# Patient Record
Sex: MALE | Race: WHITE | NOT HISPANIC OR LATINO
[De-identification: names, ages, dates, MRNs, and addresses within clinical notes are randomized per-mention and may not be internally consistent; named-entity substitution may affect disease eponyms.]

---

## 2018-03-10 ENCOUNTER — APPOINTMENT (OUTPATIENT)
Dept: ORTHOPEDIC SURGERY | Facility: CLINIC | Age: 68
End: 2018-03-10
Payer: MEDICARE

## 2018-03-10 VITALS
WEIGHT: 210 LBS | HEART RATE: 79 BPM | HEIGHT: 73 IN | BODY MASS INDEX: 27.83 KG/M2 | SYSTOLIC BLOOD PRESSURE: 115 MMHG | DIASTOLIC BLOOD PRESSURE: 74 MMHG

## 2018-03-10 DIAGNOSIS — Z86.39 PERSONAL HISTORY OF OTHER ENDOCRINE, NUTRITIONAL AND METABOLIC DISEASE: ICD-10-CM

## 2018-03-10 PROCEDURE — 73562 X-RAY EXAM OF KNEE 3: CPT | Mod: 50

## 2018-03-10 PROCEDURE — 99204 OFFICE O/P NEW MOD 45 MIN: CPT

## 2018-03-10 PROCEDURE — 73502 X-RAY EXAM HIP UNI 2-3 VIEWS: CPT

## 2018-03-10 RX ORDER — MULTIVITAMIN
TABLET ORAL
Refills: 0 | Status: ACTIVE | COMMUNITY

## 2018-03-10 RX ORDER — OMEPRAZOLE 20 MG/1
20 CAPSULE, DELAYED RELEASE ORAL
Qty: 90 | Refills: 0 | Status: ACTIVE | COMMUNITY
Start: 2017-11-13

## 2018-03-10 RX ORDER — ROSUVASTATIN CALCIUM 5 MG/1
TABLET, FILM COATED ORAL
Refills: 0 | Status: ACTIVE | COMMUNITY

## 2018-03-10 RX ORDER — FAMOTIDINE 10 MG/1
TABLET, FILM COATED ORAL
Refills: 0 | Status: ACTIVE | COMMUNITY

## 2018-03-10 RX ORDER — NEOMYCIN AND POLYMYXIN B SULFATES AND HYDROCORTISONE OTIC 10; 3.5; 1 MG/ML; MG/ML; [USP'U]/ML
3.5-10000-1 SUSPENSION AURICULAR (OTIC)
Qty: 10 | Refills: 0 | Status: ACTIVE | COMMUNITY
Start: 2018-02-19

## 2018-03-13 ENCOUNTER — FORM ENCOUNTER (OUTPATIENT)
Age: 68
End: 2018-03-13

## 2018-03-14 ENCOUNTER — OUTPATIENT (OUTPATIENT)
Dept: OUTPATIENT SERVICES | Facility: HOSPITAL | Age: 68
LOS: 1 days | End: 2018-03-14
Payer: MEDICARE

## 2018-03-14 ENCOUNTER — APPOINTMENT (OUTPATIENT)
Dept: RADIOLOGY | Facility: CLINIC | Age: 68
End: 2018-03-14
Payer: MEDICARE

## 2018-03-14 DIAGNOSIS — M16.12 UNILATERAL PRIMARY OSTEOARTHRITIS, LEFT HIP: ICD-10-CM

## 2018-03-14 PROCEDURE — 27093 INJECTION FOR HIP X-RAY: CPT

## 2018-03-14 PROCEDURE — 27093 INJECTION FOR HIP X-RAY: CPT | Mod: LT

## 2018-03-14 PROCEDURE — 73525 CONTRAST X-RAY OF HIP: CPT | Mod: 26,LT

## 2018-03-14 PROCEDURE — 73525 CONTRAST X-RAY OF HIP: CPT

## 2018-03-20 ENCOUNTER — APPOINTMENT (OUTPATIENT)
Dept: ORTHOPEDIC SURGERY | Facility: CLINIC | Age: 68
End: 2018-03-20
Payer: MEDICARE

## 2018-03-20 VITALS
HEART RATE: 72 BPM | DIASTOLIC BLOOD PRESSURE: 79 MMHG | SYSTOLIC BLOOD PRESSURE: 116 MMHG | BODY MASS INDEX: 27.7 KG/M2 | HEIGHT: 73 IN | WEIGHT: 209 LBS

## 2018-03-20 DIAGNOSIS — M17.0 BILATERAL PRIMARY OSTEOARTHRITIS OF KNEE: ICD-10-CM

## 2018-03-20 PROCEDURE — 99213 OFFICE O/P EST LOW 20 MIN: CPT | Mod: 25

## 2018-03-20 PROCEDURE — 20610 DRAIN/INJ JOINT/BURSA W/O US: CPT | Mod: 50

## 2018-03-20 RX ADMIN — Medication 0 MG/3ML: at 00:00

## 2018-03-22 RX ORDER — HYALURONATE SOD, CROSS-LINKED 30 MG/3 ML
30 SYRINGE (ML) INTRAARTICULAR
Refills: 0 | Status: COMPLETED | OUTPATIENT
Start: 2018-03-20

## 2018-03-22 RX ADMIN — LIDOCAINE HYDROCHLORIDE 3 %: 10 INJECTION, SOLUTION EPIDURAL; INFILTRATION; INTRACAUDAL; PERINEURAL at 00:00

## 2018-03-23 RX ORDER — LIDOCAINE HYDROCHLORIDE 10 MG/ML
1 INJECTION, SOLUTION INFILTRATION; PERINEURAL
Qty: 0 | Refills: 0 | Status: COMPLETED | OUTPATIENT
Start: 2018-03-22

## 2018-07-16 ENCOUNTER — APPOINTMENT (OUTPATIENT)
Dept: ORTHOPEDIC SURGERY | Facility: CLINIC | Age: 68
End: 2018-07-16

## 2018-10-09 ENCOUNTER — APPOINTMENT (OUTPATIENT)
Dept: ORTHOPEDIC SURGERY | Facility: CLINIC | Age: 68
End: 2018-10-09
Payer: MEDICARE

## 2018-10-09 VITALS
HEART RATE: 66 BPM | SYSTOLIC BLOOD PRESSURE: 104 MMHG | DIASTOLIC BLOOD PRESSURE: 67 MMHG | WEIGHT: 209 LBS | BODY MASS INDEX: 27.7 KG/M2 | HEIGHT: 73 IN

## 2018-10-09 VITALS — HEIGHT: 73 IN | BODY MASS INDEX: 27.7 KG/M2 | WEIGHT: 209 LBS

## 2018-10-09 DIAGNOSIS — M16.12 UNILATERAL PRIMARY OSTEOARTHRITIS, LEFT HIP: ICD-10-CM

## 2018-10-09 PROCEDURE — 99213 OFFICE O/P EST LOW 20 MIN: CPT

## 2018-10-09 PROCEDURE — 73502 X-RAY EXAM HIP UNI 2-3 VIEWS: CPT

## 2018-10-24 ENCOUNTER — OUTPATIENT (OUTPATIENT)
Dept: OUTPATIENT SERVICES | Facility: HOSPITAL | Age: 68
LOS: 1 days | End: 2018-10-24
Payer: MEDICARE

## 2018-10-24 VITALS
OXYGEN SATURATION: 96 % | SYSTOLIC BLOOD PRESSURE: 101 MMHG | DIASTOLIC BLOOD PRESSURE: 70 MMHG | RESPIRATION RATE: 16 BRPM | HEIGHT: 73 IN | HEART RATE: 66 BPM | TEMPERATURE: 99 F | WEIGHT: 213.85 LBS

## 2018-10-24 DIAGNOSIS — Z90.79 ACQUIRED ABSENCE OF OTHER GENITAL ORGAN(S): Chronic | ICD-10-CM

## 2018-10-24 DIAGNOSIS — Z01.818 ENCOUNTER FOR OTHER PREPROCEDURAL EXAMINATION: ICD-10-CM

## 2018-10-24 DIAGNOSIS — M25.552 PAIN IN LEFT HIP: ICD-10-CM

## 2018-10-24 DIAGNOSIS — C20 MALIGNANT NEOPLASM OF RECTUM: Chronic | ICD-10-CM

## 2018-10-24 DIAGNOSIS — M16.12 UNILATERAL PRIMARY OSTEOARTHRITIS, LEFT HIP: ICD-10-CM

## 2018-10-24 DIAGNOSIS — Z98.890 OTHER SPECIFIED POSTPROCEDURAL STATES: Chronic | ICD-10-CM

## 2018-10-24 LAB
ALBUMIN SERPL ELPH-MCNC: 4 G/DL — SIGNIFICANT CHANGE UP (ref 3.3–5)
ALP SERPL-CCNC: 85 U/L — SIGNIFICANT CHANGE UP (ref 30–120)
ALT FLD-CCNC: 31 U/L DA — SIGNIFICANT CHANGE UP (ref 10–60)
ANION GAP SERPL CALC-SCNC: 6 MMOL/L — SIGNIFICANT CHANGE UP (ref 5–17)
APTT BLD: 38.3 SEC — HIGH (ref 27.5–37.4)
AST SERPL-CCNC: 16 U/L — SIGNIFICANT CHANGE UP (ref 10–40)
BILIRUB SERPL-MCNC: 0.9 MG/DL — SIGNIFICANT CHANGE UP (ref 0.2–1.2)
BUN SERPL-MCNC: 20 MG/DL — SIGNIFICANT CHANGE UP (ref 7–23)
CALCIUM SERPL-MCNC: 9.8 MG/DL — SIGNIFICANT CHANGE UP (ref 8.4–10.5)
CHLORIDE SERPL-SCNC: 103 MMOL/L — SIGNIFICANT CHANGE UP (ref 96–108)
CO2 SERPL-SCNC: 29 MMOL/L — SIGNIFICANT CHANGE UP (ref 22–31)
CREAT SERPL-MCNC: 1.27 MG/DL — SIGNIFICANT CHANGE UP (ref 0.5–1.3)
GLUCOSE SERPL-MCNC: 89 MG/DL — SIGNIFICANT CHANGE UP (ref 70–99)
HCT VFR BLD CALC: 44.1 % — SIGNIFICANT CHANGE UP (ref 39–50)
HGB BLD-MCNC: 14.8 G/DL — SIGNIFICANT CHANGE UP (ref 13–17)
INR BLD: 1.09 RATIO — SIGNIFICANT CHANGE UP (ref 0.88–1.16)
MCHC RBC-ENTMCNC: 30.8 PG — SIGNIFICANT CHANGE UP (ref 27–34)
MCHC RBC-ENTMCNC: 33.6 GM/DL — SIGNIFICANT CHANGE UP (ref 32–36)
MCV RBC AUTO: 91.9 FL — SIGNIFICANT CHANGE UP (ref 80–100)
NRBC # BLD: 0 /100 WBCS — SIGNIFICANT CHANGE UP (ref 0–0)
PLATELET # BLD AUTO: 174 K/UL — SIGNIFICANT CHANGE UP (ref 150–400)
POTASSIUM SERPL-MCNC: 4.5 MMOL/L — SIGNIFICANT CHANGE UP (ref 3.5–5.3)
POTASSIUM SERPL-SCNC: 4.5 MMOL/L — SIGNIFICANT CHANGE UP (ref 3.5–5.3)
PROT SERPL-MCNC: 7.9 G/DL — SIGNIFICANT CHANGE UP (ref 6–8.3)
PROTHROM AB SERPL-ACNC: 11.9 SEC — SIGNIFICANT CHANGE UP (ref 9.8–12.7)
RBC # BLD: 4.8 M/UL — SIGNIFICANT CHANGE UP (ref 4.2–5.8)
RBC # FLD: 11.9 % — SIGNIFICANT CHANGE UP (ref 10.3–14.5)
SODIUM SERPL-SCNC: 138 MMOL/L — SIGNIFICANT CHANGE UP (ref 135–145)
WBC # BLD: 8.42 K/UL — SIGNIFICANT CHANGE UP (ref 3.8–10.5)
WBC # FLD AUTO: 8.42 K/UL — SIGNIFICANT CHANGE UP (ref 3.8–10.5)

## 2018-10-24 PROCEDURE — 86900 BLOOD TYPING SEROLOGIC ABO: CPT

## 2018-10-24 PROCEDURE — 86901 BLOOD TYPING SEROLOGIC RH(D): CPT

## 2018-10-24 PROCEDURE — 85610 PROTHROMBIN TIME: CPT

## 2018-10-24 PROCEDURE — 85027 COMPLETE CBC AUTOMATED: CPT

## 2018-10-24 PROCEDURE — 87641 MR-STAPH DNA AMP PROBE: CPT

## 2018-10-24 PROCEDURE — 80053 COMPREHEN METABOLIC PANEL: CPT

## 2018-10-24 PROCEDURE — 86850 RBC ANTIBODY SCREEN: CPT

## 2018-10-24 PROCEDURE — 85730 THROMBOPLASTIN TIME PARTIAL: CPT

## 2018-10-24 PROCEDURE — G0463: CPT

## 2018-10-24 PROCEDURE — 87640 STAPH A DNA AMP PROBE: CPT

## 2018-10-24 NOTE — H&P PST ADULT - PSH
Rectal cancer  2004 tumor removed  S/P insertion of penile implant  2013  S/P knee surgery  bilateral knees  S/P prostatectomy  2008

## 2018-10-24 NOTE — H&P PST ADULT - HISTORY OF PRESENT ILLNESS
this is a 69 y/o male who has had left hip pain for about a year; tests show bone on bone; to have left total hip replacement; takes advil for pain prn

## 2018-10-24 NOTE — H&P PST ADULT - PMH
Aortic aneurysm  4.4 cm-being observed  GERD (gastroesophageal reflux disease)    Osteoarthritis    Prostate cancer    Rectal cancer

## 2018-10-24 NOTE — H&P PST ADULT - NSANTHOSAYNRD_GEN_A_CORE
No. DEANN screening performed.  STOP BANG Legend: 0-2 = LOW Risk; 3-4 = INTERMEDIATE Risk; 5-8 = HIGH Risk

## 2018-10-24 NOTE — H&P PST ADULT - FAMILY HISTORY
Father  Still living? No  Family history of prostate cancer in father, Age at diagnosis: Age Unknown  Family history of diabetes mellitus, Age at diagnosis: Age Unknown     Mother  Still living? No  Family history of breast cancer, Age at diagnosis: Age Unknown     Sibling  Still living? Yes, Estimated age: 61-70  Family history of hypertension in brother, Age at diagnosis: Age Unknown

## 2018-10-25 LAB
MRSA PCR RESULT.: SIGNIFICANT CHANGE UP
S AUREUS DNA NOSE QL NAA+PROBE: DETECTED

## 2018-10-29 RX ORDER — MUPIROCIN 20 MG/G
1 OINTMENT TOPICAL
Qty: 1 | Refills: 0 | OUTPATIENT
Start: 2018-10-29 | End: 2018-11-02

## 2018-10-29 NOTE — PROGRESS NOTE ADULT - SUBJECTIVE AND OBJECTIVE BOX
Nasal culture positive for MSSA.  Rx e-scribed to pharmacy.  Spoke with patient, use of mupirocin reviewed and understood by patient.

## 2018-11-06 RX ORDER — SODIUM CHLORIDE 9 MG/ML
1000 INJECTION, SOLUTION INTRAVENOUS
Qty: 0 | Refills: 0 | Status: DISCONTINUED | OUTPATIENT
Start: 2018-11-07 | End: 2018-11-08

## 2018-11-06 RX ORDER — PANTOPRAZOLE SODIUM 20 MG/1
40 TABLET, DELAYED RELEASE ORAL DAILY
Qty: 0 | Refills: 0 | Status: DISCONTINUED | OUTPATIENT
Start: 2018-11-08 | End: 2018-11-09

## 2018-11-06 RX ORDER — DOCUSATE SODIUM 100 MG
100 CAPSULE ORAL THREE TIMES A DAY
Qty: 0 | Refills: 0 | Status: DISCONTINUED | OUTPATIENT
Start: 2018-11-07 | End: 2018-11-09

## 2018-11-06 RX ORDER — POLYETHYLENE GLYCOL 3350 17 G/17G
17 POWDER, FOR SOLUTION ORAL DAILY
Qty: 0 | Refills: 0 | Status: DISCONTINUED | OUTPATIENT
Start: 2018-11-07 | End: 2018-11-09

## 2018-11-06 RX ORDER — MAGNESIUM HYDROXIDE 400 MG/1
30 TABLET, CHEWABLE ORAL DAILY
Qty: 0 | Refills: 0 | Status: DISCONTINUED | OUTPATIENT
Start: 2018-11-07 | End: 2018-11-09

## 2018-11-06 RX ORDER — ONDANSETRON 8 MG/1
4 TABLET, FILM COATED ORAL EVERY 6 HOURS
Qty: 0 | Refills: 0 | Status: DISCONTINUED | OUTPATIENT
Start: 2018-11-07 | End: 2018-11-09

## 2018-11-06 RX ORDER — SENNA PLUS 8.6 MG/1
2 TABLET ORAL AT BEDTIME
Qty: 0 | Refills: 0 | Status: DISCONTINUED | OUTPATIENT
Start: 2018-11-07 | End: 2018-11-09

## 2018-11-07 ENCOUNTER — APPOINTMENT (OUTPATIENT)
Dept: ORTHOPEDIC SURGERY | Facility: HOSPITAL | Age: 68
End: 2018-11-07

## 2018-11-07 ENCOUNTER — RESULT REVIEW (OUTPATIENT)
Age: 68
End: 2018-11-07

## 2018-11-07 ENCOUNTER — INPATIENT (INPATIENT)
Facility: HOSPITAL | Age: 68
LOS: 1 days | Discharge: HOME | DRG: 470 | End: 2018-11-09
Attending: ORTHOPAEDIC SURGERY | Admitting: ORTHOPAEDIC SURGERY
Payer: MEDICARE

## 2018-11-07 VITALS
RESPIRATION RATE: 14 BRPM | HEART RATE: 64 BPM | HEIGHT: 73 IN | TEMPERATURE: 97 F | WEIGHT: 211.64 LBS | OXYGEN SATURATION: 96 % | SYSTOLIC BLOOD PRESSURE: 127 MMHG | DIASTOLIC BLOOD PRESSURE: 74 MMHG

## 2018-11-07 DIAGNOSIS — C20 MALIGNANT NEOPLASM OF RECTUM: Chronic | ICD-10-CM

## 2018-11-07 DIAGNOSIS — I71.9 AORTIC ANEURYSM OF UNSPECIFIED SITE, WITHOUT RUPTURE: ICD-10-CM

## 2018-11-07 DIAGNOSIS — Z01.818 ENCOUNTER FOR OTHER PREPROCEDURAL EXAMINATION: ICD-10-CM

## 2018-11-07 DIAGNOSIS — M16.12 UNILATERAL PRIMARY OSTEOARTHRITIS, LEFT HIP: ICD-10-CM

## 2018-11-07 DIAGNOSIS — M16.9 OSTEOARTHRITIS OF HIP, UNSPECIFIED: ICD-10-CM

## 2018-11-07 DIAGNOSIS — Z98.890 OTHER SPECIFIED POSTPROCEDURAL STATES: Chronic | ICD-10-CM

## 2018-11-07 DIAGNOSIS — K21.9 GASTRO-ESOPHAGEAL REFLUX DISEASE WITHOUT ESOPHAGITIS: ICD-10-CM

## 2018-11-07 DIAGNOSIS — Z29.9 ENCOUNTER FOR PROPHYLACTIC MEASURES, UNSPECIFIED: ICD-10-CM

## 2018-11-07 DIAGNOSIS — E78.5 HYPERLIPIDEMIA, UNSPECIFIED: ICD-10-CM

## 2018-11-07 DIAGNOSIS — Z90.79 ACQUIRED ABSENCE OF OTHER GENITAL ORGAN(S): Chronic | ICD-10-CM

## 2018-11-07 PROBLEM — M19.90 UNSPECIFIED OSTEOARTHRITIS, UNSPECIFIED SITE: Chronic | Status: ACTIVE | Noted: 2018-10-24

## 2018-11-07 LAB
ANION GAP SERPL CALC-SCNC: 9 MMOL/L — SIGNIFICANT CHANGE UP (ref 5–17)
APTT BLD: 36.5 SEC — SIGNIFICANT CHANGE UP (ref 28.5–37)
BUN SERPL-MCNC: 19 MG/DL — SIGNIFICANT CHANGE UP (ref 7–23)
CALCIUM SERPL-MCNC: 8.7 MG/DL — SIGNIFICANT CHANGE UP (ref 8.4–10.5)
CHLORIDE SERPL-SCNC: 105 MMOL/L — SIGNIFICANT CHANGE UP (ref 96–108)
CO2 SERPL-SCNC: 26 MMOL/L — SIGNIFICANT CHANGE UP (ref 22–31)
CREAT SERPL-MCNC: 1.17 MG/DL — SIGNIFICANT CHANGE UP (ref 0.5–1.3)
GLUCOSE SERPL-MCNC: 139 MG/DL — HIGH (ref 70–99)
HCT VFR BLD CALC: 37.9 % — LOW (ref 39–50)
HGB BLD-MCNC: 13.1 G/DL — SIGNIFICANT CHANGE UP (ref 13–17)
POTASSIUM SERPL-MCNC: 3.9 MMOL/L — SIGNIFICANT CHANGE UP (ref 3.5–5.3)
POTASSIUM SERPL-SCNC: 3.9 MMOL/L — SIGNIFICANT CHANGE UP (ref 3.5–5.3)
SODIUM SERPL-SCNC: 140 MMOL/L — SIGNIFICANT CHANGE UP (ref 135–145)

## 2018-11-07 PROCEDURE — 88305 TISSUE EXAM BY PATHOLOGIST: CPT | Mod: 26

## 2018-11-07 PROCEDURE — 27130 TOTAL HIP ARTHROPLASTY: CPT | Mod: AS,LT

## 2018-11-07 PROCEDURE — 27130 TOTAL HIP ARTHROPLASTY: CPT | Mod: LT

## 2018-11-07 PROCEDURE — 99222 1ST HOSP IP/OBS MODERATE 55: CPT

## 2018-11-07 PROCEDURE — 88311 DECALCIFY TISSUE: CPT | Mod: 26

## 2018-11-07 RX ORDER — SODIUM CHLORIDE 9 MG/ML
1000 INJECTION INTRAMUSCULAR; INTRAVENOUS; SUBCUTANEOUS ONCE
Qty: 0 | Refills: 0 | Status: COMPLETED | OUTPATIENT
Start: 2018-11-07 | End: 2018-11-07

## 2018-11-07 RX ORDER — HYDROMORPHONE HYDROCHLORIDE 2 MG/ML
0.5 INJECTION INTRAMUSCULAR; INTRAVENOUS; SUBCUTANEOUS
Qty: 0 | Refills: 0 | Status: DISCONTINUED | OUTPATIENT
Start: 2018-11-07 | End: 2018-11-07

## 2018-11-07 RX ORDER — TRANEXAMIC ACID 100 MG/ML
1000 INJECTION, SOLUTION INTRAVENOUS ONCE
Qty: 0 | Refills: 0 | Status: COMPLETED | OUTPATIENT
Start: 2018-11-07 | End: 2018-11-07

## 2018-11-07 RX ORDER — APREPITANT 80 MG/1
40 CAPSULE ORAL ONCE
Qty: 0 | Refills: 0 | Status: COMPLETED | OUTPATIENT
Start: 2018-11-07 | End: 2018-11-07

## 2018-11-07 RX ORDER — APIXABAN 2.5 MG/1
2.5 TABLET, FILM COATED ORAL
Qty: 0 | Refills: 0 | Status: COMPLETED | OUTPATIENT
Start: 2018-11-08 | End: 2018-11-08

## 2018-11-07 RX ORDER — METOPROLOL TARTRATE 50 MG
25 TABLET ORAL DAILY
Qty: 0 | Refills: 0 | Status: DISCONTINUED | OUTPATIENT
Start: 2018-11-07 | End: 2018-11-09

## 2018-11-07 RX ORDER — APIXABAN 2.5 MG/1
2.5 TABLET, FILM COATED ORAL EVERY 12 HOURS
Qty: 0 | Refills: 0 | Status: DISCONTINUED | OUTPATIENT
Start: 2018-11-09 | End: 2018-11-09

## 2018-11-07 RX ORDER — ACETAMINOPHEN 500 MG
1000 TABLET ORAL EVERY 6 HOURS
Qty: 0 | Refills: 0 | Status: COMPLETED | OUTPATIENT
Start: 2018-11-07 | End: 2018-11-08

## 2018-11-07 RX ORDER — HYDROMORPHONE HYDROCHLORIDE 2 MG/ML
0.5 INJECTION INTRAMUSCULAR; INTRAVENOUS; SUBCUTANEOUS
Qty: 0 | Refills: 0 | Status: DISCONTINUED | OUTPATIENT
Start: 2018-11-07 | End: 2018-11-09

## 2018-11-07 RX ORDER — CEFAZOLIN SODIUM 1 G
2000 VIAL (EA) INJECTION ONCE
Qty: 0 | Refills: 0 | Status: COMPLETED | OUTPATIENT
Start: 2018-11-07 | End: 2018-11-07

## 2018-11-07 RX ORDER — OXYCODONE HYDROCHLORIDE 5 MG/1
5 TABLET ORAL
Qty: 0 | Refills: 0 | Status: DISCONTINUED | OUTPATIENT
Start: 2018-11-07 | End: 2018-11-09

## 2018-11-07 RX ORDER — ACETAMINOPHEN 500 MG
1000 TABLET ORAL ONCE
Qty: 0 | Refills: 0 | Status: COMPLETED | OUTPATIENT
Start: 2018-11-07 | End: 2018-11-07

## 2018-11-07 RX ORDER — ACETAMINOPHEN 500 MG
1000 TABLET ORAL EVERY 8 HOURS
Qty: 0 | Refills: 0 | Status: DISCONTINUED | OUTPATIENT
Start: 2018-11-08 | End: 2018-11-09

## 2018-11-07 RX ORDER — CEFAZOLIN SODIUM 1 G
2000 VIAL (EA) INJECTION EVERY 8 HOURS
Qty: 0 | Refills: 0 | Status: COMPLETED | OUTPATIENT
Start: 2018-11-07 | End: 2018-11-08

## 2018-11-07 RX ORDER — CELECOXIB 200 MG/1
200 CAPSULE ORAL
Qty: 0 | Refills: 0 | Status: DISCONTINUED | OUTPATIENT
Start: 2018-11-07 | End: 2018-11-09

## 2018-11-07 RX ORDER — ONDANSETRON 8 MG/1
4 TABLET, FILM COATED ORAL ONCE
Qty: 0 | Refills: 0 | Status: DISCONTINUED | OUTPATIENT
Start: 2018-11-07 | End: 2018-11-07

## 2018-11-07 RX ORDER — CHLORHEXIDINE GLUCONATE 213 G/1000ML
1 SOLUTION TOPICAL ONCE
Qty: 0 | Refills: 0 | Status: COMPLETED | OUTPATIENT
Start: 2018-11-07 | End: 2018-11-07

## 2018-11-07 RX ORDER — OXYCODONE HYDROCHLORIDE 5 MG/1
10 TABLET ORAL
Qty: 0 | Refills: 0 | Status: DISCONTINUED | OUTPATIENT
Start: 2018-11-07 | End: 2018-11-09

## 2018-11-07 RX ORDER — SODIUM CHLORIDE 9 MG/ML
1000 INJECTION, SOLUTION INTRAVENOUS
Qty: 0 | Refills: 0 | Status: DISCONTINUED | OUTPATIENT
Start: 2018-11-07 | End: 2018-11-07

## 2018-11-07 RX ORDER — ATORVASTATIN CALCIUM 80 MG/1
40 TABLET, FILM COATED ORAL AT BEDTIME
Qty: 0 | Refills: 0 | Status: DISCONTINUED | OUTPATIENT
Start: 2018-11-07 | End: 2018-11-09

## 2018-11-07 RX ORDER — INFLUENZA VIRUS VACCINE 15; 15; 15; 15 UG/.5ML; UG/.5ML; UG/.5ML; UG/.5ML
0.5 SUSPENSION INTRAMUSCULAR ONCE
Qty: 0 | Refills: 0 | Status: COMPLETED | OUTPATIENT
Start: 2018-11-07 | End: 2018-11-07

## 2018-11-07 RX ADMIN — Medication 100 MILLIGRAM(S): at 19:33

## 2018-11-07 RX ADMIN — APREPITANT 40 MILLIGRAM(S): 80 CAPSULE ORAL at 10:11

## 2018-11-07 RX ADMIN — HYDROMORPHONE HYDROCHLORIDE 0.5 MILLIGRAM(S): 2 INJECTION INTRAMUSCULAR; INTRAVENOUS; SUBCUTANEOUS at 22:20

## 2018-11-07 RX ADMIN — CHLORHEXIDINE GLUCONATE 1 APPLICATION(S): 213 SOLUTION TOPICAL at 10:11

## 2018-11-07 RX ADMIN — ATORVASTATIN CALCIUM 40 MILLIGRAM(S): 80 TABLET, FILM COATED ORAL at 22:06

## 2018-11-07 RX ADMIN — HYDROMORPHONE HYDROCHLORIDE 0.5 MILLIGRAM(S): 2 INJECTION INTRAMUSCULAR; INTRAVENOUS; SUBCUTANEOUS at 16:50

## 2018-11-07 RX ADMIN — SODIUM CHLORIDE 100 MILLILITER(S): 9 INJECTION, SOLUTION INTRAVENOUS at 19:45

## 2018-11-07 RX ADMIN — HYDROMORPHONE HYDROCHLORIDE 0.5 MILLIGRAM(S): 2 INJECTION INTRAMUSCULAR; INTRAVENOUS; SUBCUTANEOUS at 17:30

## 2018-11-07 RX ADMIN — HYDROMORPHONE HYDROCHLORIDE 0.5 MILLIGRAM(S): 2 INJECTION INTRAMUSCULAR; INTRAVENOUS; SUBCUTANEOUS at 22:35

## 2018-11-07 RX ADMIN — SODIUM CHLORIDE 1000 MILLILITER(S): 9 INJECTION INTRAMUSCULAR; INTRAVENOUS; SUBCUTANEOUS at 22:01

## 2018-11-07 RX ADMIN — Medication 1000 MILLIGRAM(S): at 20:00

## 2018-11-07 RX ADMIN — Medication 200 MILLIGRAM(S): at 17:02

## 2018-11-07 RX ADMIN — SODIUM CHLORIDE 100 MILLILITER(S): 9 INJECTION, SOLUTION INTRAVENOUS at 14:23

## 2018-11-07 RX ADMIN — Medication 400 MILLIGRAM(S): at 18:52

## 2018-11-07 NOTE — BRIEF OPERATIVE NOTE - PROCEDURE
<<-----Click on this checkbox to enter Procedure Total hip replacement  11/07/2018  Left , Anterior approach  Active  RANDALL

## 2018-11-07 NOTE — CONSULT NOTE ADULT - ASSESSMENT
Asymptomatic post-op 67 y/o M with PMH of Dyslipidemia, Thoracic Aortic Aneurysm, Rectal CA s/p resection, Prostate CA s/p prostatectomy, OA, and GERD.

## 2018-11-07 NOTE — PHYSICAL THERAPY INITIAL EVALUATION ADULT - ADDITIONAL COMMENTS
Pt lives in house with no steps. Following discharge pt will be staying in condo with girlfriend. Condo has no LEONA or steps inside. Pt lives in house with no steps. Following discharge pt will be staying in condo with girlfriend. Condo has no LEONA or steps inside. Pt needs rolling walker.

## 2018-11-07 NOTE — PHYSICAL THERAPY INITIAL EVALUATION ADULT - PRECAUTIONS/LIMITATIONS, REHAB EVAL
surgical precautions No hyperextension, no extreme abduction and no extreme external rotation/surgical precautions

## 2018-11-07 NOTE — CONSULT NOTE ADULT - PROBLEM SELECTOR RECOMMENDATION 5
Patient is at high risk for VTE, was started on B/L intermittent pneumatic compression device for DVT prophylaxis, pharmacological DVT prophylaxis as per orthopedic surgery team.

## 2018-11-07 NOTE — PHYSICAL THERAPY INITIAL EVALUATION ADULT - GAIT DEVIATIONS NOTED, PT EVAL
decreased stride length/decreased velocity of limb motion/decreased weight-shifting ability/decreased jean carlos/decreased step length

## 2018-11-07 NOTE — PHYSICAL THERAPY INITIAL EVALUATION ADULT - CRITERIA FOR SKILLED THERAPEUTIC INTERVENTIONS
functional limitations in following categories/impairments found/anticipated discharge recommendation/predicted duration of therapy intervention/therapy frequency/anticipated equipment needs at discharge

## 2018-11-07 NOTE — CONSULT NOTE ADULT - SUBJECTIVE AND OBJECTIVE BOX
This is a 67 y/o M with PMH of Dyslipidemia, Thoracic Aortic Aneurysm, Rectal CA s/p resection, Prostate CA s/p prostatectomy, OA, and GERD who presented for left total knee replacement. Patient used to have constant pain over the past year that worsens to level of 7/10 on getting into the car, or up from a chair , with only little relief with Tylenol, temporary relief with local steroid injections, seen by ortho, scheduled for left total hip arthroplasty for today. Routine post-op medical evaluation was requested. Patient denies any chest pain, SOB, palpitations, dizziness, headache, paranesthesias, or focal muscle weakness.            ALLERGIES:     NKDA            PAST MEDICAL & SURGICAL HISTORY:    Rectal cancer  Osteoarthritis  Prostate cancer  Aortic aneurysm: 4.4 cm-being observed  GERD (gastroesophageal reflux disease)  S/P insertion of penile implant: 2013  Rectal cancer: 2004 tumor removed  S/P prostatectomy: 2008  S/P knee surgery: bilateral knees            SOCIAL HISTORY:     , retired marketing sales, non smoker, social ETOH, no drug abuse.            FAMILY HISTORY:    Family history of hypertension in brother (Sibling)  Family history of breast cancer (Mother)  Family history of diabetes mellitus (Father)  Family history of prostate cancer in father (Father)              MEDICATIONS  (STANDING):    acetaminophen  IVPB .. 1000 milliGRAM(s) IV Intermittent every 6 hours  atorvastatin 40 milliGRAM(s) Oral at bedtime  ceFAZolin   IVPB 2000 milliGRAM(s) IV Intermittent every 8 hours  celecoxib 200 milliGRAM(s) Oral two times a day  docusate sodium 100 milliGRAM(s) Oral three times a day  influenza   Vaccine 0.5 milliLiter(s) IntraMuscular once  lactated ringers. 1000 milliLiter(s) (125 mL/Hr) IV Continuous <Continuous>  metoprolol succinate ER 25 milliGRAM(s) Oral daily  pantoprazole    Tablet 40 milliGRAM(s) Oral daily  senna 2 Tablet(s) Oral at bedtime              MEDICATIONS  (PRN):    aluminum hydroxide/magnesium hydroxide/simethicone Suspension 30 milliLiter(s) Oral four times a day PRN Indigestion  HYDROmorphone  Injectable 0.5 milliGRAM(s) IV Push every 3 hours PRN Severe Pain (7 - 10)  magnesium hydroxide Suspension 30 milliLiter(s) Oral daily PRN Constipation  ondansetron Injectable 4 milliGRAM(s) IV Push every 6 hours PRN Nausea and/or Vomiting  oxyCODONE    IR 5 milliGRAM(s) Oral every 3 hours PRN Mild Pain (1 - 3)  oxyCODONE    IR 10 milliGRAM(s) Oral every 3 hours PRN Moderate Pain (4 - 6)  polyethylene glycol 3350 17 Gram(s) Oral daily PRN Constipation        Home Medications:    Aspirin Low Dose 81 mg oral delayed release tablet: 1 tab(s) orally once a day (07 Nov 2018 10:01)  Crestor 10 mg oral tablet: 1 tab(s) orally once a day (at bedtime) (07 Nov 2018 10:01)  lysine 1000 mg oral tablet: 1 tab(s) orally once a day (07 Nov 2018 10:01)  metoprolol succinate 25 mg oral tablet, extended release: 1 tab(s) orally once a day (07 Nov 2018 10:01)  Multiple Vitamins oral capsule: 1 cap(s) orally once a day (07 Nov 2018 10:01)  Pepcid 20 mg oral tablet: 1 tab(s) orally 2 times a day (07 Nov 2018 10:01)        REVIEW OF SYSTEMS:    CONSTITUTIONAL: No fever, (+)  fatigue, feels tired.  EYES: No eye pain, visual disturbances, or discharge.  ENMT:  No difficulty hearing, tinnitus, vertigo; No sinus or throat pain.  NECK: No pain or stiffness.  RESPIRATORY: No cough, wheezing, or hemoptysis; No shortness of breath.  CARDIOVASCULAR: No chest pain, palpitations, dizziness, or leg swelling.  GASTROINTESTINAL: No abdominal pain, no nausea, vomiting, or hematemesis; No diarrhea or Change in bowel habits. No melena or hematochezia.  GENITOURINARY: No dysuria, frequency, hematuria, or incontinence.  NEUROLOGICAL: No headaches, focal muscle weakness, numbness, or tremors.  SKIN: No itching, burning or rashes.  MUSCULOSKELETAL: No joint swelling or pain other than surgical site.  PSYCHIATRIC: No depression, anxiety, or agitation.  HEME/LYMPH: No easy bruising, bleeding gums, or nose bleed.  ALLERGY AND IMMUNOLOGIC: No hives or eczema.                Vital signs:  Vital Signs Last 24 Hrs  T(C): 36.6 (07 Nov 2018 20:15), Max: 36.7 (07 Nov 2018 13:39)  T(F): 97.8 (07 Nov 2018 20:15), Max: 98 (07 Nov 2018 13:39)  HR: 64 (07 Nov 2018 20:15) (60 - 74)  BP: 108/64 (07 Nov 2018 20:15) (101/62 - 127/74)  BP(mean): --  RR: 16 (07 Nov 2018 20:15) (12 - 18)  SpO2: 98% (07 Nov 2018 20:15) (96% - 100%)              PHYSICAL EXAM:    GENERAL: NAD, well-groomed, well-developed.  HEAD:  Atraumatic, Norm cephalic.  EYES: PERRLA, conjunctiva clear.  ENMT: no nasal discharge, no obinna-pharyngeal erythema or exudates, MMM.   NECK: Supple, No JVD.  NERVOUS SYSTEM:  Alert & oriented X3, neurologically intact grossly.  CHEST/LUNG: decreased air entry B/L equal, no rales, rhonchi, or wheezing.  HEART: Normal S1 & S2, no murmurs, or extra sounds.  ABDOMEN: Soft, non-tender, non-distended; bowel sounds present, no palpable masses or organomegaly.  EXTREMITIES:  No clubbing, cyanosis, or edema.  VASCULAR: 2+ radial, carotid pulses B/L, no carotid bruits.  SKIN: No rashes or lesions.  PSYCH: normal affect & behavior.              LABs:                     13.1   x      )----------(  x         ( 07 Nov 2018 18:38 )               37.9      140    |  105    |  19     ----------------------------<  139        ( 07 Nov 2018 18:38 )  3.9     |  26     |  1.17     Ca    8.7        ( 07 Nov 2018 18:38 )          PTT -  36.5 sec   ( 07 Nov 2018 09:28 )  CAPILLARY BLOOD GLUCOSE        EKG:    As per my review shows SR at 75/min, normal CO & QTc intervals, normal QRS voltage, duration, and axis (+15), with normal transition, no ST-T abnormality.  Done on October 24th, 2018 for PST.  -

## 2018-11-07 NOTE — CONSULT NOTE ADULT - PROBLEM SELECTOR RECOMMENDATION 2
Thoracic, following up regularly as an outpatient, last CT showed diameter of 4.4 cm, will continue on Metoprolol with hold parameters.

## 2018-11-07 NOTE — CONSULT NOTE ADULT - PROBLEM SELECTOR RECOMMENDATION 9
s/p right total hip arthroplasty, asymptomatic post-op, pain control, bowel regimen, IVF hydration, I & O monitoring, incentive spirometry, PT & OT consults, mobilization & weight bearing as per orthopedic surgery team.

## 2018-11-07 NOTE — PHYSICAL THERAPY INITIAL EVALUATION ADULT - GENERAL OBSERVATIONS, REHAB EVAL
Pt supine in bed. +IV, +Supplemental oxygen, +PAS + Pt supine in bed. +IV, +Supplemental oxygen, +PAS, +pink foam wedges Pt supine in bed. +IV, +Supplemental oxygen, +PAS, +pink foam wedges, +hemovac

## 2018-11-08 ENCOUNTER — TRANSCRIPTION ENCOUNTER (OUTPATIENT)
Age: 68
End: 2018-11-08

## 2018-11-08 LAB
ANION GAP SERPL CALC-SCNC: 8 MMOL/L — SIGNIFICANT CHANGE UP (ref 5–17)
BUN SERPL-MCNC: 17 MG/DL — SIGNIFICANT CHANGE UP (ref 7–23)
CALCIUM SERPL-MCNC: 8.5 MG/DL — SIGNIFICANT CHANGE UP (ref 8.4–10.5)
CHLORIDE SERPL-SCNC: 104 MMOL/L — SIGNIFICANT CHANGE UP (ref 96–108)
CO2 SERPL-SCNC: 27 MMOL/L — SIGNIFICANT CHANGE UP (ref 22–31)
CREAT SERPL-MCNC: 1.08 MG/DL — SIGNIFICANT CHANGE UP (ref 0.5–1.3)
GLUCOSE SERPL-MCNC: 119 MG/DL — HIGH (ref 70–99)
HCT VFR BLD CALC: 36 % — LOW (ref 39–50)
HGB BLD-MCNC: 12 G/DL — LOW (ref 13–17)
MCHC RBC-ENTMCNC: 30.8 PG — SIGNIFICANT CHANGE UP (ref 27–34)
MCHC RBC-ENTMCNC: 33.3 GM/DL — SIGNIFICANT CHANGE UP (ref 32–36)
MCV RBC AUTO: 92.3 FL — SIGNIFICANT CHANGE UP (ref 80–100)
NRBC # BLD: 0 /100 WBCS — SIGNIFICANT CHANGE UP (ref 0–0)
PLATELET # BLD AUTO: 147 K/UL — LOW (ref 150–400)
POTASSIUM SERPL-MCNC: 4.6 MMOL/L — SIGNIFICANT CHANGE UP (ref 3.5–5.3)
POTASSIUM SERPL-SCNC: 4.6 MMOL/L — SIGNIFICANT CHANGE UP (ref 3.5–5.3)
RBC # BLD: 3.9 M/UL — LOW (ref 4.2–5.8)
RBC # FLD: 11.9 % — SIGNIFICANT CHANGE UP (ref 10.3–14.5)
SODIUM SERPL-SCNC: 139 MMOL/L — SIGNIFICANT CHANGE UP (ref 135–145)
WBC # BLD: 10.26 K/UL — SIGNIFICANT CHANGE UP (ref 3.8–10.5)
WBC # FLD AUTO: 10.26 K/UL — SIGNIFICANT CHANGE UP (ref 3.8–10.5)

## 2018-11-08 PROCEDURE — 99233 SBSQ HOSP IP/OBS HIGH 50: CPT

## 2018-11-08 RX ORDER — PANTOPRAZOLE SODIUM 20 MG/1
1 TABLET, DELAYED RELEASE ORAL
Qty: 30 | Refills: 1 | OUTPATIENT
Start: 2018-11-08 | End: 2019-01-06

## 2018-11-08 RX ORDER — APIXABAN 2.5 MG/1
1 TABLET, FILM COATED ORAL
Qty: 38 | Refills: 0 | OUTPATIENT
Start: 2018-11-08 | End: 2018-11-26

## 2018-11-08 RX ORDER — CELECOXIB 200 MG/1
1 CAPSULE ORAL
Qty: 38 | Refills: 0 | OUTPATIENT
Start: 2018-11-08 | End: 2018-11-26

## 2018-11-08 RX ADMIN — Medication 400 MILLIGRAM(S): at 01:08

## 2018-11-08 RX ADMIN — HYDROMORPHONE HYDROCHLORIDE 0.5 MILLIGRAM(S): 2 INJECTION INTRAMUSCULAR; INTRAVENOUS; SUBCUTANEOUS at 06:21

## 2018-11-08 RX ADMIN — Medication 100 MILLIGRAM(S): at 21:42

## 2018-11-08 RX ADMIN — CELECOXIB 200 MILLIGRAM(S): 200 CAPSULE ORAL at 21:43

## 2018-11-08 RX ADMIN — Medication 1000 MILLIGRAM(S): at 14:57

## 2018-11-08 RX ADMIN — Medication 100 MILLIGRAM(S): at 02:45

## 2018-11-08 RX ADMIN — CELECOXIB 200 MILLIGRAM(S): 200 CAPSULE ORAL at 09:04

## 2018-11-08 RX ADMIN — Medication 25 MILLIGRAM(S): at 06:21

## 2018-11-08 RX ADMIN — APIXABAN 2.5 MILLIGRAM(S): 2.5 TABLET, FILM COATED ORAL at 12:00

## 2018-11-08 RX ADMIN — SENNA PLUS 2 TABLET(S): 8.6 TABLET ORAL at 21:42

## 2018-11-08 RX ADMIN — CELECOXIB 200 MILLIGRAM(S): 200 CAPSULE ORAL at 21:42

## 2018-11-08 RX ADMIN — Medication 400 MILLIGRAM(S): at 06:21

## 2018-11-08 RX ADMIN — Medication 1000 MILLIGRAM(S): at 02:00

## 2018-11-08 RX ADMIN — CELECOXIB 200 MILLIGRAM(S): 200 CAPSULE ORAL at 09:38

## 2018-11-08 RX ADMIN — Medication 1000 MILLIGRAM(S): at 21:42

## 2018-11-08 RX ADMIN — HYDROMORPHONE HYDROCHLORIDE 0.5 MILLIGRAM(S): 2 INJECTION INTRAMUSCULAR; INTRAVENOUS; SUBCUTANEOUS at 06:36

## 2018-11-08 RX ADMIN — PANTOPRAZOLE SODIUM 40 MILLIGRAM(S): 20 TABLET, DELAYED RELEASE ORAL at 06:21

## 2018-11-08 RX ADMIN — Medication 30 MILLILITER(S): at 16:28

## 2018-11-08 RX ADMIN — Medication 1000 MILLIGRAM(S): at 21:43

## 2018-11-08 RX ADMIN — APIXABAN 2.5 MILLIGRAM(S): 2.5 TABLET, FILM COATED ORAL at 21:43

## 2018-11-08 RX ADMIN — Medication 1000 MILLIGRAM(S): at 06:50

## 2018-11-08 RX ADMIN — ATORVASTATIN CALCIUM 40 MILLIGRAM(S): 80 TABLET, FILM COATED ORAL at 21:42

## 2018-11-08 NOTE — DISCHARGE NOTE ADULT - INSTRUCTIONS
For Constipation :   • Increase your water intake. Drink at least 8 glasses of water daily.  • Try adding fiber to your diet by eating fruits, vegetables and foods that are rich in grains.  • If you do experience constipation, you may take an over-the-counter stool softener/laxative such as Jazmin Colace, Senekot or  Milk of Magnesia. Low cholesterol diet    For Constipation :   • Increase your water intake. Drink at least 8 glasses of water daily.  • Try adding fiber to your diet by eating fruits, vegetables and foods that are rich in grains.  • If you do experience constipation, you may take an over-the-counter stool softener/laxative such as Jazmin Colace, Senekot or  Milk of Magnesia.

## 2018-11-08 NOTE — DISCHARGE NOTE ADULT - NS AS ACTIVITY OBS
Showering allowed/Stairs allowed/Walking-Indoors allowed may shower when surgical wounds completely dry/Walking-Outdoors allowed/Showering allowed/Walking-Indoors allowed/Stairs allowed/Do not drive or operate machinery

## 2018-11-08 NOTE — PROGRESS NOTE ADULT - SUBJECTIVE AND OBJECTIVE BOX
Patient is a 68y old  Male who presents with a chief complaint of left thr    Subjective: feels well. pain tolerable   good appetite.  participating in PT.     mild nausea    MEDICATIONS  (STANDING):  acetaminophen   Tablet .. 1000 milliGRAM(s) Oral every 8 hours  apixaban 2.5 milliGRAM(s) Oral <User Schedule>  atorvastatin 40 milliGRAM(s) Oral at bedtime  celecoxib 200 milliGRAM(s) Oral two times a day  docusate sodium 100 milliGRAM(s) Oral three times a day  metoprolol succinate ER 25 milliGRAM(s) Oral daily  pantoprazole    Tablet 40 milliGRAM(s) Oral daily  senna 2 Tablet(s) Oral at bedtime    MEDICATIONS  (PRN):  aluminum hydroxide/magnesium hydroxide/simethicone Suspension 30 milliLiter(s) Oral four times a day PRN Indigestion  HYDROmorphone  Injectable 0.5 milliGRAM(s) IV Push every 3 hours PRN Severe Pain (7 - 10)  magnesium hydroxide Suspension 30 milliLiter(s) Oral daily PRN Constipation  ondansetron Injectable 4 milliGRAM(s) IV Push every 6 hours PRN Nausea and/or Vomiting  oxyCODONE    IR 5 milliGRAM(s) Oral every 3 hours PRN Mild Pain (1 - 3)  oxyCODONE    IR 10 milliGRAM(s) Oral every 3 hours PRN Moderate Pain (4 - 6)  polyethylene glycol 3350 17 Gram(s) Oral daily PRN Constipation      Allergies    No Known Allergies    Intolerances        REVIEW OF SYSTEMS:  negative unless otherwise specified above.    Vital Signs Last 24 Hrs  T(C): 35 (08 Nov 2018 10:43), Max: 36.9 (08 Nov 2018 07:41)  T(F): 95 (08 Nov 2018 10:43), Max: 98.5 (08 Nov 2018 07:41)  HR: 64 (08 Nov 2018 10:43) (60 - 74)  BP: 98/61 (08 Nov 2018 10:43) (95/60 - 120/87)  BP(mean): --  RR: 16 (08 Nov 2018 10:43) (12 - 18)  SpO2: 95% (08 Nov 2018 10:43) (95% - 100%)    PHYSICAL EXAM:  GENERAL: No apparent distress  HEAD:  Atraumatic, Normocephalic  EYES: conjunctiva and sclera clear  ENMT: Moist mucous membranes  NECK: Supple  CHEST/LUNG: Clear to auscultation bilaterally  HEART: Regular rate and rhythm  ABDOMEN: Soft, Nontender, Nondistended; Bowel sounds present  EXTREMITIES:  No clubbing, cyanosis or edema  SKIN: No rashes or lesions  NERVOUS SYSTEM:  Alert & Oriented X3; Bilateral Lower extremity mobile, sensation to light touch intact  INCISION:  Dressing dry and intact        LABS:   Hematocrit: 36.0 % <L> (11-08 @ 06:28)  RBC Count: 3.90 M/uL <L> (11-08 @ 06:28)  Platelet Count - Automated: 147 K/uL <L> (11-08 @ 06:28)  Hemoglobin: 12.0 g/dL <L> (11-08 @ 06:28)  WBC Count: 10.26 K/uL (11-08 @ 06:28)  Hemoglobin: 13.1 g/dL (11-07 @ 18:38)  Hematocrit: 37.9 % <L> (11-07 @ 18:38)      11-08    139  |  104  |  17  ----------------------------<  119<H>  4.6   |  27  |  1.08    Ca    8.5      08 Nov 2018 06:28                  PTT - ( 07 Nov 2018 09:28 )  PTT:36.5 sec                      IMAGING: images reviewed personally      Consultant Notes Reviewed and Care Discussed with relevant Consultants/Other Providers.

## 2018-11-08 NOTE — PROGRESS NOTE ADULT - SUBJECTIVE AND OBJECTIVE BOX
ORTHOPEDIC PA PROGRESS NOTE  NEISHA PRETTY      68y Male                                                                                                                               POD # 1       STATUS POST:               Pre-Op Dx: Primary osteoarthritis of left hip    Post-Op Dx:  Primary osteoarthritis of left hip    Procedure: Total hip replacement: Left , Anterior approach                                              Patient comfortable pain controlled with current pain meds.  voiding urine passing gas and tolerating p.o diet.  No complaints  Pain (0-10):   Current Pain Management:  [ ] PCA   [x ] Po Analgesics [ x] IM /IV Anagesics     T(F): 98.3  HR: 67  BP: 107/65  RR: 16  SpO2: 96%                        12.0   10.26 )-----------( 147      ( 08 Nov 2018 06:28 )             36.0                     11-08    139  |  104  |  17  ----------------------------<  119<H>  4.6   |  27  |  1.08    Ca    8.5      08 Nov 2018 06:28      Physical Exam :    -   Surgical site clean and dry hemovac to self suction Output 250 over last shift  -   Distal Neurvascular status intact grossly.   -   Warm well perfused; capillary refill <3 seconds   -   (+)EHL/FHL 5/5 dorsi/plantar flexion intact  -   (+) Sensation to light touch  -   (-) Calf tenderness Bilaterally    A/P: 68y Male s/p Primary osteoarthritis of left hip    -   Ortho Stable  -   Pain control   -   Medicine to follow  -   DVT ppx:     [x ]SCDs     [ x] ASA     [ ] Eliquis     [ ] Lovenox  -   Weight bearing status:  WBAT [x ]        PWB    [ ]     TTWB  [ ]      NWB  [ ]   -  Dispo:     Home [x ]     Acute Rehab [ ]     JOHANNY [ ]     TBD [ ]

## 2018-11-08 NOTE — DISCHARGE NOTE ADULT - CARE PLAN
Principal Discharge DX:	Primary osteoarthritis of left hip  Goal:	Improve ambulation, ADLs and quality of life  Assessment and plan of treatment:	Physical Therapy/Occupational Therapy for: Ambulation, transfers, stairs, & ADLs, isometrics.  Full weight bearing on both legs; Walker/cane use as instructed by Physical therapy/Occupational therapy. Anterior Total Hip Replacement precautions for  6 weeks: No straight leg raise; No external rotation of hip when extended-standing or lying flat; No hyperextension of hip when standing (kickback).   Ice packs to hip for 30 min. every 3 hours and after physical therapy.   Keep incision clean and dry. May shower 5 days after surgery if no drainage from incision.  Prineo tape/suture removal on/near after Post Op Day # 14 in rehab facility / Surgeon's office.  Assessment and plan of treatment:	- Call your doctor if you experience:  • An increase in pain not controlled by pain medication or change in activity or  position.  • Temperature greater than 101° F.  • Redness, increased swelling or foul smelling drainage from or around the  incision.  • Numbness, tingling or a change in color or temperature of the operative leg.  • Call your doctor immediately if you experience chest pain, shortness of breath or calf pain.

## 2018-11-08 NOTE — OCCUPATIONAL THERAPY INITIAL EVALUATION ADULT - GENERAL OBSERVATIONS, REHAB EVAL
Pt found in bed +IV, SCD's, hemovac pink wedges placed to maintain neutral position of hip while pt supine in bed

## 2018-11-08 NOTE — DISCHARGE NOTE ADULT - ADDITIONAL INSTRUCTIONS
Follow up with Dr. Strange on 11/21/18 at 9:00 Follow up with Dr. Strange on 11/21/18 at 9:00am in his private office

## 2018-11-08 NOTE — OCCUPATIONAL THERAPY INITIAL EVALUATION ADULT - ADDITIONAL COMMENTS
Pt lives alone in house with no steps. Following discharge pt will be staying in condo with girlfriend. Condo has no LEONA or steps inside. Pt lives alone in house with no steps. Following discharge pt will be staying in condo with girlfriend. Condo has no LEONA or steps inside  +stall shower with built in seat and RTS

## 2018-11-08 NOTE — DISCHARGE NOTE ADULT - PATIENT PORTAL LINK FT
You can access the [facility] Patient Portal, offered by [facility], by registering with the following website: [email or web address]

## 2018-11-08 NOTE — OCCUPATIONAL THERAPY INITIAL EVALUATION ADULT - PRECAUTIONS/LIMITATIONS, REHAB EVAL
surgical precautions/No hyperextension, no extreme external rotation, no extreme abduction of operated LE./fall precautions

## 2018-11-08 NOTE — PROGRESS NOTE ADULT - PROBLEM SELECTOR PLAN 1
opiates prn + bowel regimen.   Physical Therapy evaluation.  Discussed with orthopedic service Physician Assistant.

## 2018-11-08 NOTE — DISCHARGE NOTE ADULT - MEDICATION SUMMARY - MEDICATIONS TO STOP TAKING
I will STOP taking the medications listed below when I get home from the hospital:    Pepcid 20 mg oral tablet  -- 1 tab(s) by mouth 2 times a day    lysine 1000 mg oral tablet  -- 1 tab(s) by mouth once a day    Aspirin Low Dose 81 mg oral delayed release tablet  -- 1 tab(s) by mouth once a day    mupirocin 2% topical ointment  -- 1 application in each nostril 2 times a day   -- For external use only.

## 2018-11-08 NOTE — DISCHARGE NOTE ADULT - HOSPITAL COURSE
This patient was admitted to [facility] with a history of severe degenerative joint disease of the Left hip.  Patient went to Pre-Surgical Testing at [facility] and was medically cleared to undergo elective procedure. Left THR perfomed No operative or hollie-operative complications arose during patients hospital course.  Patient received antibiotic according to SCIP guidelines for infection prevention. Ecotrin was given for DVT prophylaxis.  Anesthesia, Medical Hospitalist, Physical Therapy and Occupational Therapy were consulted. Patient is stable for discharge with a good prognosis.  Appropriate discharge instructions and medications are provided in this document. This 67yo male patient was admitted to [facility] with a history of severe degenerative joint disease of the Left hip.  Patient went to Pre-Surgical Testing at [facility] and was medically cleared to undergo elective procedure. Left THR perfomed by Dr. Yvonne Strange on 11/7/18.  No operative or hollie-operative complications arose during patient's hospital course.  Patient received antibiotic according to SCIP guidelines for infection prevention. Eliquis was given for DVT prophylaxis; Celebrex for H.O. prevention.  Anesthesia, Medical Hospitalist, Physical Therapy and Occupational Therapy were consulted. Patient is stable for discharge home with home care services and with a good prognosis on 11/9/18.  Appropriate discharge instructions and medications are provided in this document.

## 2018-11-09 VITALS
OXYGEN SATURATION: 97 % | DIASTOLIC BLOOD PRESSURE: 57 MMHG | SYSTOLIC BLOOD PRESSURE: 97 MMHG | RESPIRATION RATE: 16 BRPM | TEMPERATURE: 98 F | HEART RATE: 71 BPM

## 2018-11-09 LAB
ANION GAP SERPL CALC-SCNC: 6 MMOL/L — SIGNIFICANT CHANGE UP (ref 5–17)
BUN SERPL-MCNC: 17 MG/DL — SIGNIFICANT CHANGE UP (ref 7–23)
CALCIUM SERPL-MCNC: 8.8 MG/DL — SIGNIFICANT CHANGE UP (ref 8.4–10.5)
CHLORIDE SERPL-SCNC: 108 MMOL/L — SIGNIFICANT CHANGE UP (ref 96–108)
CO2 SERPL-SCNC: 28 MMOL/L — SIGNIFICANT CHANGE UP (ref 22–31)
CREAT SERPL-MCNC: 1.13 MG/DL — SIGNIFICANT CHANGE UP (ref 0.5–1.3)
GLUCOSE SERPL-MCNC: 87 MG/DL — SIGNIFICANT CHANGE UP (ref 70–99)
HCT VFR BLD CALC: 34.6 % — LOW (ref 39–50)
HGB BLD-MCNC: 11.8 G/DL — LOW (ref 13–17)
MCHC RBC-ENTMCNC: 31.1 PG — SIGNIFICANT CHANGE UP (ref 27–34)
MCHC RBC-ENTMCNC: 34.1 GM/DL — SIGNIFICANT CHANGE UP (ref 32–36)
MCV RBC AUTO: 91.1 FL — SIGNIFICANT CHANGE UP (ref 80–100)
NRBC # BLD: 0 /100 WBCS — SIGNIFICANT CHANGE UP (ref 0–0)
PLATELET # BLD AUTO: 134 K/UL — LOW (ref 150–400)
POTASSIUM SERPL-MCNC: 4.2 MMOL/L — SIGNIFICANT CHANGE UP (ref 3.5–5.3)
POTASSIUM SERPL-SCNC: 4.2 MMOL/L — SIGNIFICANT CHANGE UP (ref 3.5–5.3)
RBC # BLD: 3.8 M/UL — LOW (ref 4.2–5.8)
RBC # FLD: 12 % — SIGNIFICANT CHANGE UP (ref 10.3–14.5)
SODIUM SERPL-SCNC: 142 MMOL/L — SIGNIFICANT CHANGE UP (ref 135–145)
WBC # BLD: 8.63 K/UL — SIGNIFICANT CHANGE UP (ref 3.8–10.5)
WBC # FLD AUTO: 8.63 K/UL — SIGNIFICANT CHANGE UP (ref 3.8–10.5)

## 2018-11-09 PROCEDURE — 97535 SELF CARE MNGMENT TRAINING: CPT

## 2018-11-09 PROCEDURE — 97116 GAIT TRAINING THERAPY: CPT

## 2018-11-09 PROCEDURE — 97530 THERAPEUTIC ACTIVITIES: CPT

## 2018-11-09 PROCEDURE — C1713: CPT

## 2018-11-09 PROCEDURE — 88311 DECALCIFY TISSUE: CPT

## 2018-11-09 PROCEDURE — 88305 TISSUE EXAM BY PATHOLOGIST: CPT

## 2018-11-09 PROCEDURE — 97110 THERAPEUTIC EXERCISES: CPT

## 2018-11-09 PROCEDURE — 85027 COMPLETE CBC AUTOMATED: CPT

## 2018-11-09 PROCEDURE — 80048 BASIC METABOLIC PNL TOTAL CA: CPT

## 2018-11-09 PROCEDURE — C1889: CPT

## 2018-11-09 PROCEDURE — C1776: CPT

## 2018-11-09 PROCEDURE — 36415 COLL VENOUS BLD VENIPUNCTURE: CPT

## 2018-11-09 PROCEDURE — 85730 THROMBOPLASTIN TIME PARTIAL: CPT

## 2018-11-09 PROCEDURE — 85018 HEMOGLOBIN: CPT

## 2018-11-09 PROCEDURE — 97161 PT EVAL LOW COMPLEX 20 MIN: CPT

## 2018-11-09 PROCEDURE — 99239 HOSP IP/OBS DSCHRG MGMT >30: CPT

## 2018-11-09 PROCEDURE — 97165 OT EVAL LOW COMPLEX 30 MIN: CPT

## 2018-11-09 PROCEDURE — 76000 FLUOROSCOPY <1 HR PHYS/QHP: CPT

## 2018-11-09 PROCEDURE — 85014 HEMATOCRIT: CPT

## 2018-11-09 RX ORDER — POLYETHYLENE GLYCOL 3350 17 G/17G
17 POWDER, FOR SOLUTION ORAL
Qty: 0 | Refills: 0 | COMMUNITY
Start: 2018-11-09

## 2018-11-09 RX ORDER — APIXABAN 2.5 MG/1
1 TABLET, FILM COATED ORAL
Qty: 7 | Refills: 0 | OUTPATIENT
Start: 2018-11-09

## 2018-11-09 RX ORDER — ASPIRIN/CALCIUM CARB/MAGNESIUM 324 MG
1 TABLET ORAL
Qty: 0 | Refills: 0 | COMMUNITY

## 2018-11-09 RX ORDER — SENNA PLUS 8.6 MG/1
2 TABLET ORAL
Qty: 0 | Refills: 0 | COMMUNITY
Start: 2018-11-09

## 2018-11-09 RX ORDER — ACETAMINOPHEN 500 MG
2 TABLET ORAL
Qty: 0 | Refills: 0 | COMMUNITY
Start: 2018-11-09

## 2018-11-09 RX ORDER — LANOLIN ALCOHOL/MO/W.PET/CERES
1 CREAM (GRAM) TOPICAL
Qty: 0 | Refills: 0 | COMMUNITY

## 2018-11-09 RX ORDER — FAMOTIDINE 10 MG/ML
1 INJECTION INTRAVENOUS
Qty: 0 | Refills: 0 | COMMUNITY

## 2018-11-09 RX ORDER — DOCUSATE SODIUM 100 MG
1 CAPSULE ORAL
Qty: 0 | Refills: 0 | COMMUNITY
Start: 2018-11-09

## 2018-11-09 RX ORDER — OXYCODONE HYDROCHLORIDE 5 MG/1
1 TABLET ORAL
Qty: 42 | Refills: 0 | OUTPATIENT
Start: 2018-11-09 | End: 2018-11-15

## 2018-11-09 RX ADMIN — PANTOPRAZOLE SODIUM 40 MILLIGRAM(S): 20 TABLET, DELAYED RELEASE ORAL at 05:52

## 2018-11-09 RX ADMIN — Medication 1000 MILLIGRAM(S): at 12:26

## 2018-11-09 RX ADMIN — Medication 25 MILLIGRAM(S): at 05:52

## 2018-11-09 RX ADMIN — Medication 1000 MILLIGRAM(S): at 05:53

## 2018-11-09 RX ADMIN — Medication 100 MILLIGRAM(S): at 05:52

## 2018-11-09 RX ADMIN — CELECOXIB 200 MILLIGRAM(S): 200 CAPSULE ORAL at 09:56

## 2018-11-09 RX ADMIN — APIXABAN 2.5 MILLIGRAM(S): 2.5 TABLET, FILM COATED ORAL at 09:56

## 2018-11-09 RX ADMIN — CELECOXIB 200 MILLIGRAM(S): 200 CAPSULE ORAL at 09:57

## 2018-11-09 RX ADMIN — Medication 100 MILLIGRAM(S): at 12:26

## 2018-11-09 NOTE — PROGRESS NOTE ADULT - ASSESSMENT
Asymptomatic post-op 69 y/o M with PMH of Dyslipidemia, Thoracic Aortic Aneurysm, Rectal CA s/p resection, Prostate CA s/p prostatectomy, OA, and GERD.

## 2018-11-09 NOTE — PROGRESS NOTE ADULT - SUBJECTIVE AND OBJECTIVE BOX
Orthopedic P.A.- POD# 2 - s/p anterior Left THR    Patient alert and comfortable in bed with oral meds for pain control.  Denies hip pain or nausea.    Vital Signs Last 24 Hrs  T(C): 36.5 (09 Nov 2018 07:32), Max: 37.2 (08 Nov 2018 15:00)  T(F): 97.7 (09 Nov 2018 07:32), Max: 99 (08 Nov 2018 15:00)  HR: 66 (09 Nov 2018 07:32) (64 - 75)  BP: 102/63 (09 Nov 2018 07:32) (95/69 - 110/66)  BP(mean): --  RR: 16 (09 Nov 2018 07:32) (16 - 17)  SpO2: 95% (09 Nov 2018 07:32) (95% - 96%)         I&O's Detail    08 Nov 2018 07:01  -  09 Nov 2018 07:00  --------------------------------------------------------  IN:  Total IN: 0 mL    OUT:    Accordian drain left hip: 170 mL over     Voided: 400 mL  Total OUT: 570 mL    Total NET: -570 mL             CAPILLARY BLOOD GLUCOSE      Labs:                          11.8<L>  8.63  )-----------( 134<L>    ( 09 Nov 2018 07:22 )             34.6<L>  09 Nov 2018 07:22                                     MEDICATIONS:acetaminophen   Tablet .. 1000 milliGRAM(s) Oral every 8 hours  aluminum hydroxide/magnesium hydroxide/simethicone Suspension 30 milliLiter(s) Oral four times a day PRN  apixaban 2.5 milliGRAM(s) Oral every 12 hours  atorvastatin 40 milliGRAM(s) Oral at bedtime  bisacodyl Suppository 10 milliGRAM(s) Rectal daily PRN  celecoxib 200 milliGRAM(s) Oral two times a day  docusate sodium 100 milliGRAM(s) Oral three times a day  HYDROmorphone  Injectable 0.5 milliGRAM(s) IV Push every 3 hours PRN  magnesium hydroxide Suspension 30 milliLiter(s) Oral daily PRN  metoprolol succinate ER 25 milliGRAM(s) Oral daily  ondansetron Injectable 4 milliGRAM(s) IV Push every 6 hours PRN  oxyCODONE    IR 5 milliGRAM(s) Oral every 3 hours PRN  oxyCODONE    IR 10 milliGRAM(s) Oral every 3 hours PRN  pantoprazole    Tablet 40 milliGRAM(s) Oral daily  polyethylene glycol 3350 17 Gram(s) Oral daily PRN  senna 2 Tablet(s) Oral at bedtime    Anticoagulation:  apixaban 2.5 milliGRAM(s) Oral every 12 hours        Pain medications:   acetaminophen   Tablet .. 1000 milliGRAM(s) Oral every 8 hours  celecoxib 200 milliGRAM(s) Oral two times a day  HYDROmorphone  Injectable 0.5 milliGRAM(s) IV Push every 3 hours PRN  ondansetron Injectable 4 milliGRAM(s) IV Push every 6 hours PRN  oxyCODONE    IR 5 milliGRAM(s) Oral every 3 hours PRN  oxyCODONE    IR 10 milliGRAM(s) Oral every 3 hours PRN                                       Physical Exam:  *hip- Abduction pillow in place.  Primary surgical bandage dry and intact.  *Hemovac drain patent.  Neurovascular grossly intact LE's.  EHL/ant.tibs 5/5.  PAS on LE's.  Calves soft and non-tender.                                                                                                                                                        A/P:  Orthopedically stable.  -Continue pain management with *  -DVT prophylaxis with *  -Check labs again tomorrow  -PT/OT to increase ambulation and review dislocation precautions  - * for medical care  -Discharge planning for *  -Further plan as per attendings. Orthopedic P.A.- POD# 2 - s/p anterior Left THR    Patient alert and comfortable in bed with oral meds for pain control.  Denies hip pain or nausea.    Vital Signs Last 24 Hrs  T(C): 36.5 (09 Nov 2018 07:32), Max: 37.2 (08 Nov 2018 15:00)  T(F): 97.7 (09 Nov 2018 07:32), Max: 99 (08 Nov 2018 15:00)  HR: 66 (09 Nov 2018 07:32) (64 - 75)  BP: 102/63 (09 Nov 2018 07:32) (95/69 - 110/66)  BP(mean): --  RR: 16 (09 Nov 2018 07:32) (16 - 17)  SpO2: 95% (09 Nov 2018 07:32) (95% - 96%)         I&O's Detail    08 Nov 2018 07:01  -  09 Nov 2018 07:00  --------------------------------------------------------  IN:  Total IN: 0 mL    OUT:    Accordian drain left hip: 110 mL over 12 hours     Voided: 400 mL        Labs:                          11.8<L>  8.63  )-----------( 134<L>    ( 09 Nov 2018 07:22 )             34.6<L>  09 Nov 2018 07:22                   *BMP in testing*                  MEDICATIONS:acetaminophen   Tablet .. 1000 milliGRAM(s) Oral every 8 hours  aluminum hydroxide/magnesium hydroxide/simethicone Suspension 30 milliLiter(s) Oral four times a day PRN  apixaban 2.5 milliGRAM(s) Oral every 12 hours  atorvastatin 40 milliGRAM(s) Oral at bedtime  bisacodyl Suppository 10 milliGRAM(s) Rectal daily PRN  celecoxib 200 milliGRAM(s) Oral two times a day  docusate sodium 100 milliGRAM(s) Oral three times a day  HYDROmorphone  Injectable 0.5 milliGRAM(s) IV Push every 3 hours PRN  magnesium hydroxide Suspension 30 milliLiter(s) Oral daily PRN  metoprolol succinate ER 25 milliGRAM(s) Oral daily  ondansetron Injectable 4 milliGRAM(s) IV Push every 6 hours PRN  oxyCODONE    IR 5 milliGRAM(s) Oral every 3 hours PRN  oxyCODONE    IR 10 milliGRAM(s) Oral every 3 hours PRN  pantoprazole    Tablet 40 milliGRAM(s) Oral daily  polyethylene glycol 3350 17 Gram(s) Oral daily PRN  senna 2 Tablet(s) Oral at bedtime    Anticoagulation:  apixaban 2.5 milliGRAM(s) Oral every 12 hours        Pain medications:   acetaminophen   Tablet .. 1000 milliGRAM(s) Oral every 8 hours  celecoxib 200 milliGRAM(s) Oral two times a day  HYDROmorphone  Injectable 0.5 milliGRAM(s) IV Push every 3 hours PRN  ondansetron Injectable 4 milliGRAM(s) IV Push every 6 hours PRN  oxyCODONE    IR 5 milliGRAM(s) Oral every 3 hours PRN  oxyCODONE    IR 10 milliGRAM(s) Oral every 3 hours PRN                                       Physical Exam:  Left hip- Primary surgical bandage and hemovac drain removed. Prineo tape dry and intact over surgical wound. Sterile bandage placed over drain site.  Neurovascular grossly intact LE's.  EHL/ant.tibs 5/5.  PAS on LE's.  Calves soft and non-tender.                                                                                                                                                        A/P:  Orthopedically stable.  -Continue pain management with above plan.  -DVT prophylaxis with 6 weeks of Eliquis.  -Check BMP today; CBC OK (decreasing platelets).  -PT/OT to increase ambulation and review anterior dislocation precautions and to clear patient for safe ambulation and stairs today.  -Dr. Moyer for medical clearance for discharge home today.  -Further plan as per attendings. Orthopedic P.A.- POD# 2 - s/p anterior Left THR    Patient alert and comfortable in bed with oral meds for pain control.  Denies hip pain or nausea.    Vital Signs Last 24 Hrs  T(C): 36.5 (09 Nov 2018 07:32), Max: 37.2 (08 Nov 2018 15:00)  T(F): 97.7 (09 Nov 2018 07:32), Max: 99 (08 Nov 2018 15:00)  HR: 66 (09 Nov 2018 07:32) (64 - 75)  BP: 102/63 (09 Nov 2018 07:32) (95/69 - 110/66)  BP(mean): --  RR: 16 (09 Nov 2018 07:32) (16 - 17)  SpO2: 95% (09 Nov 2018 07:32) (95% - 96%)         I&O's Detail    08 Nov 2018 07:01  -  09 Nov 2018 07:00  --------------------------------------------------------  IN:  Total IN: 0 mL    OUT:    Accordian drain left hip: 110 mL over 12 hours     Voided: 400 mL        Labs:                          11.8<L>  8.63  )-----------( 134<L>    ( 09 Nov 2018 07:22 )             34.6<L>  09 Nov 2018 07:22                   *BMP in testing*                  MEDICATIONS:acetaminophen   Tablet .. 1000 milliGRAM(s) Oral every 8 hours  aluminum hydroxide/magnesium hydroxide/simethicone Suspension 30 milliLiter(s) Oral four times a day PRN  apixaban 2.5 milliGRAM(s) Oral every 12 hours  atorvastatin 40 milliGRAM(s) Oral at bedtime  bisacodyl Suppository 10 milliGRAM(s) Rectal daily PRN  celecoxib 200 milliGRAM(s) Oral two times a day  docusate sodium 100 milliGRAM(s) Oral three times a day  HYDROmorphone  Injectable 0.5 milliGRAM(s) IV Push every 3 hours PRN  magnesium hydroxide Suspension 30 milliLiter(s) Oral daily PRN  metoprolol succinate ER 25 milliGRAM(s) Oral daily  ondansetron Injectable 4 milliGRAM(s) IV Push every 6 hours PRN  oxyCODONE    IR 5 milliGRAM(s) Oral every 3 hours PRN  oxyCODONE    IR 10 milliGRAM(s) Oral every 3 hours PRN  pantoprazole    Tablet 40 milliGRAM(s) Oral daily  polyethylene glycol 3350 17 Gram(s) Oral daily PRN  senna 2 Tablet(s) Oral at bedtime    Anticoagulation:  apixaban 2.5 milliGRAM(s) Oral every 12 hours        Pain medications:   acetaminophen   Tablet .. 1000 milliGRAM(s) Oral every 8 hours  celecoxib 200 milliGRAM(s) Oral two times a day  HYDROmorphone  Injectable 0.5 milliGRAM(s) IV Push every 3 hours PRN  ondansetron Injectable 4 milliGRAM(s) IV Push every 6 hours PRN  oxyCODONE    IR 5 milliGRAM(s) Oral every 3 hours PRN  oxyCODONE    IR 10 milliGRAM(s) Oral every 3 hours PRN                                       Physical Exam:  Left hip- Primary surgical bandage and hemovac drain removed. Prineo tape dry and intact over surgical wound. Sterile bandage placed over drain site.  Neurovascular grossly intact LE's.  EHL/ant.tibs 5/5.  PAS on LE's.  Calves soft and non-tender.                                                                                                                                                        A/P:  Orthopedically stable.  -Continue pain management with above plan.  -DVT prophylaxis with 6 weeks of Eliquis; 21 days of Celebrex for H.O. prevention (await authorization from his insurance).  -Check BMP today; CBC OK (decreasing platelets).  -PT/OT to increase ambulation and review anterior dislocation precautions and to clear patient for safe ambulation and stairs today.  -Dr. Moyer for medical clearance for discharge home today.  -Further plan as per attendings.

## 2018-11-09 NOTE — PROGRESS NOTE ADULT - SUBJECTIVE AND OBJECTIVE BOX
Patient is a 68y old  Male who presents with a chief complaint of left thr (08 Nov 2018 13:53)      Subjective: feels well. pain tolerable   good appetite.  participating in PT.     MEDICATIONS  (STANDING):  acetaminophen   Tablet .. 1000 milliGRAM(s) Oral every 8 hours  apixaban 2.5 milliGRAM(s) Oral every 12 hours  atorvastatin 40 milliGRAM(s) Oral at bedtime  celecoxib 200 milliGRAM(s) Oral two times a day  docusate sodium 100 milliGRAM(s) Oral three times a day  metoprolol succinate ER 25 milliGRAM(s) Oral daily  pantoprazole    Tablet 40 milliGRAM(s) Oral daily  senna 2 Tablet(s) Oral at bedtime    MEDICATIONS  (PRN):  aluminum hydroxide/magnesium hydroxide/simethicone Suspension 30 milliLiter(s) Oral four times a day PRN Indigestion  bisacodyl Suppository 10 milliGRAM(s) Rectal daily PRN If no bowel movement by POD#2  HYDROmorphone  Injectable 0.5 milliGRAM(s) IV Push every 3 hours PRN Severe Pain (7 - 10)  magnesium hydroxide Suspension 30 milliLiter(s) Oral daily PRN Constipation  ondansetron Injectable 4 milliGRAM(s) IV Push every 6 hours PRN Nausea and/or Vomiting  oxyCODONE    IR 5 milliGRAM(s) Oral every 3 hours PRN Mild Pain (1 - 3)  oxyCODONE    IR 10 milliGRAM(s) Oral every 3 hours PRN Moderate Pain (4 - 6)  polyethylene glycol 3350 17 Gram(s) Oral daily PRN Constipation      Allergies    No Known Allergies    Intolerances        REVIEW OF SYSTEMS:  negative unless otherwise specified above.    Vital Signs Last 24 Hrs  T(C): 36.9 (09 Nov 2018 11:33), Max: 37.2 (08 Nov 2018 15:00)  T(F): 98.5 (09 Nov 2018 11:33), Max: 99 (08 Nov 2018 15:00)  HR: 71 (09 Nov 2018 11:33) (64 - 75)  BP: 97/57 (09 Nov 2018 11:33) (95/69 - 110/66)  BP(mean): --  RR: 16 (09 Nov 2018 11:33) (16 - 17)  SpO2: 97% (09 Nov 2018 11:33) (95% - 97%)    PHYSICAL EXAM:  GENERAL: No apparent distress  HEAD:  Atraumatic, Normocephalic  EYES: conjunctiva and sclera clear  ENMT: Moist mucous membranes  NECK: Supple  CHEST/LUNG: Clear to auscultation bilaterally  HEART: Regular rate and rhythm  ABDOMEN: Soft, Nontender, Nondistended; Bowel sounds present  EXTREMITIES:  No clubbing, cyanosis or edema  SKIN: No rashes or lesions  NERVOUS SYSTEM:  Alert & Oriented X3; Bilateral Lower extremity mobile, sensation to light touch intact  INCISION:  Dressing dry and intact        LABS:   WBC Count: 8.63 K/uL (11-09 @ 07:22)  Platelet Count - Automated: 134 K/uL <L> (11-09 @ 07:22)  Hematocrit: 34.6 % <L> (11-09 @ 07:22)  RBC Count: 3.80 M/uL <L> (11-09 @ 07:22)  Hemoglobin: 11.8 g/dL <L> (11-09 @ 07:22)      11-09    142  |  108  |  17  ----------------------------<  87  4.2   |  28  |  1.13    Ca    8.8      09 Nov 2018 07:22                                        IMAGING: images reviewed personally      Consultant Notes Reviewed and Care Discussed with relevant Consultants/Other Providers.

## 2018-11-09 NOTE — PROGRESS NOTE ADULT - SUBJECTIVE AND OBJECTIVE BOX
Discharge medication calendar:  (ASA 81mg Qday preop) -- PAD  Eliquis 2.5mg q12h x 35 days  APAP 1000mg q8h x 2-3 weeks  Celecoxib 200mg q12h x 21 days. Resume ASA 81mg Qday after Celecoxib completed.  Pantoprazole 40mg QAM x 6 weeks  Narcotic PRN  Docusate 100mg TID while taking narcotic  Miralax, Senna, or Bisacodyl PRN for treatment of constipation

## 2018-11-09 NOTE — PHARMACOTHERAPY INTERVENTION NOTE - COMMENTS
Transition of Care education at bedside - medication calendar given to patient. Pharmacy fill confirmed. Eliquis and Celecoxib required Prior Auth -- done..

## 2018-11-21 ENCOUNTER — APPOINTMENT (OUTPATIENT)
Dept: ORTHOPEDIC SURGERY | Facility: CLINIC | Age: 68
End: 2018-11-21
Payer: MEDICARE

## 2018-11-21 PROCEDURE — 99024 POSTOP FOLLOW-UP VISIT: CPT

## 2018-11-21 PROCEDURE — 73502 X-RAY EXAM HIP UNI 2-3 VIEWS: CPT | Mod: LT

## 2018-11-21 RX ORDER — ACETAMINOPHEN 325 MG/1
TABLET, FILM COATED ORAL
Refills: 0 | Status: ACTIVE | COMMUNITY

## 2019-01-08 ENCOUNTER — APPOINTMENT (OUTPATIENT)
Dept: ORTHOPEDIC SURGERY | Facility: CLINIC | Age: 69
End: 2019-01-08
Payer: MEDICARE

## 2019-01-08 VITALS
SYSTOLIC BLOOD PRESSURE: 96 MMHG | HEIGHT: 73 IN | HEART RATE: 67 BPM | DIASTOLIC BLOOD PRESSURE: 63 MMHG | WEIGHT: 209 LBS | BODY MASS INDEX: 27.7 KG/M2

## 2019-01-08 DIAGNOSIS — Z82.61 FAMILY HISTORY OF ARTHRITIS: ICD-10-CM

## 2019-01-08 DIAGNOSIS — M17.11 UNILATERAL PRIMARY OSTEOARTHRITIS, RIGHT KNEE: ICD-10-CM

## 2019-01-08 DIAGNOSIS — M17.12 UNILATERAL PRIMARY OSTEOARTHRITIS, LEFT KNEE: ICD-10-CM

## 2019-01-08 PROCEDURE — 73502 X-RAY EXAM HIP UNI 2-3 VIEWS: CPT

## 2019-01-08 PROCEDURE — 99024 POSTOP FOLLOW-UP VISIT: CPT

## 2019-03-26 ENCOUNTER — APPOINTMENT (OUTPATIENT)
Dept: ORTHOPEDIC SURGERY | Facility: CLINIC | Age: 69
End: 2019-03-26
Payer: MEDICARE

## 2019-03-26 VITALS
HEIGHT: 73 IN | WEIGHT: 209 LBS | HEART RATE: 60 BPM | DIASTOLIC BLOOD PRESSURE: 68 MMHG | BODY MASS INDEX: 27.7 KG/M2 | SYSTOLIC BLOOD PRESSURE: 102 MMHG

## 2019-03-26 PROBLEM — M17.12 PRIMARY OSTEOARTHRITIS OF LEFT KNEE: Status: ACTIVE | Noted: 2018-03-10

## 2019-03-26 PROBLEM — M17.11 PRIMARY OSTEOARTHRITIS OF RIGHT KNEE: Status: ACTIVE | Noted: 2018-03-10

## 2019-03-26 PROCEDURE — 99214 OFFICE O/P EST MOD 30 MIN: CPT

## 2019-03-26 PROCEDURE — 73562 X-RAY EXAM OF KNEE 3: CPT | Mod: 50

## 2019-03-26 NOTE — REASON FOR VISIT
[Knee Pain] : knee pain [Osteoarthritis, Knee] : osteoarthritis, knee [Spouse] : spouse [FreeTextEntry2] : Severe pain right and left knee

## 2019-03-26 NOTE — DISCUSSION/SUMMARY
[Medication Risks Reviewed] : Medication risks reviewed [Surgical risks reviewed] : Surgical risks reviewed [de-identified] : Dr. Strange evaluated this patient and is guiding this patient's entire orthopedic management plan. The patient was advised that based upon their clinical presentation and radiographic findings they meet inclusion criteria for benefitting from a total knee arthroplasty as a treatment option for severe arthritis of the left than right staged knee.\par The spectrum of treatments for severe knee DJD were reviewed in detail. Dr. Strange reviewed in detail the goals, alternatives, risks and benefits of total knee arthroplasty. \par The patient was advised of the possible complications which are inclusive of but not exclusive to VTE-related, infectious-related, anesthetic-related, surgically-related, medically-related, and implant-related. \par The patient was advised of the goals, alternatives, risks and benefits of autologous, directed and banked blood product transfusions for perioperative blood losses.\par The patient was advised that they will require a medical preoperative risk evaluation by their PCP. Further medical subspecialty clearances such as cardiac may be indicated if felt needed by their PCP.\par The patient was advised he/she may call our office after careful consideration of their treatment options and further consultation with any other physician to begin the process of preoperative planning for elective left than right staged TKR at a time of their choosing. Patient was educated using models and the actual implant. He understands we do this surgery as a staged procedure doing the left side first. Provided he is cleared preoperatively we will proceed with the right knee several days later. It is not clear wishes not to proceed, we will cancel the second side and do it at a later date\par \par

## 2019-03-26 NOTE — PHYSICAL EXAM
[Antalgic] : antalgic [UE/LE] : Sensory: Intact in bilateral upper & lower extremities [ALL] : dorsalis pedis, posterior tibial, femoral, popliteal, and radial 2+ and symmetric bilaterally [Normal RUE] : Right Upper Extremity: No scars, rashes, lesions, ulcers, skin intact [Normal LUE] : Left Upper Extremity: No scars, rashes, lesions, ulcers, skin intact [Normal RLE] : Right Lower Extremity: No scars, rashes, lesions, ulcers, skin intact [Normal LLE] : Left Lower Extremity: No scars, rashes, lesions, ulcers, skin intact [Normal] : Oriented to person, place, and time, insight and judgement were intact and the affect was normal [de-identified] : Left Knee: Skin is clean, dry, and intact\par Swelling: Moderate\par Effusion: Moderate\par Alignment: Varus malalignment\par Tenderness: all 3 compartments\par ROM: Flexion: 1:15 deg.; Extension: -10 deg, 10° flexion contracture, no extension lag\par Laxity less than 5° laxity with varus valgus tibial stress\par PF crepitus: Marked; associated pain\par Quadricep formation: Attenuated in Extension & Flexion:\par Quad/Ham St:5/5\par \par Right  Knee: Skin clean, dry, and intact\par Swelling: Moderate\par Effusion: Moderate\par Alignment: Varus malalignment\par Tenderness:all 3 compartments\par ROM: Flexion: 120 deg.; Extension: -20deg, there is a positive flexion contracture, no extension lag\par Laxity: Less than 5° laxity with varus valgus tibial stress\par PF crepitus: Marked crepitation; associated pain\par Quadricep formation: Attenuated in Extension & Flexion:\par Quad/Ham St: 5/5\par Patient has full range of motion of both his hips without pain, discomfort, and stability. Good motor and sensory both of his legs. Calves are soft, nontender, no evident of DVT at this time. Patient has good motor and sensory\par  [de-identified] : intact [de-identified] : Severe tricompartmental DJD to the right and left knee with multiple subchondral cysts, periarticular osteophytes, bone-on-bone contact medial femoral tibia compartments and the patellofemoral joints bilaterally no gross evidence of AVN

## 2019-03-26 NOTE — HISTORY OF PRESENT ILLNESS
[1] : the patient reports pain that is 1/10 in severity [Chills] : no chills [Constipation] : no constipation [Diarrhea] : no diarrhea [Dysuria] : no dysuria [Fever] : no fever [Nausea] : no nausea [Vomiting] : no vomiting [Clean/Dry/Intact] : clean, dry and intact [Healed] : healed [Erythema] : not erythematous [Discharge] : absent of discharge [Swelling] : not swollen [Dehiscence] : not dehisced [Neuro Intact] : an unremarkable neurological exam [Vascular Intact] : ~T peripheral vascular exam normal [Negative Magdaleno's] : maneuvers demonstrated a negative Magdaleno's sign [de-identified] : Status post left total hip replacement from the anterior approach on November 7, 2018 [de-identified] : On physical examination of the left hip. Patient is status post left total hip replacement from the anterior approach. There is a well-healed surgical scar with no evidence of any redness swelling or heat noted. There is no pain or tenderness on examination. He does have excellent range of motion in all planes without reproducible pain. There is no evidence of leg length discrepancy. No evidence of any muscle atrophy. No evidence of any motor or sensory deficit. Patient does have good distal pulses and no calf tenderness. [de-identified] : X-rays of the left hip revealed status post left total hip replacement. Hardware is in place until the alignment as well as fixation. There is no evidence of any fracture dislocation or loosening of any hardware. [de-identified] : Status post left total hip replacement from the anterior approach on November 7, 2018 [de-identified] : Plan for the patient is to continue with the present level of activities. This includes a good home exercise program. He is also encouraged to continue with ice moist heat as needed along with pain medication. It is suggested that he return back to the office for his 3 month postoperative visit with x-rays. However she expresses an increase in pain or discomfort to notify the office. [de-identified] : Patient is a 68-year-old gentleman who presents for follow up of both of his knees. The patient has a diagnosis of severe DJD to the left and right knee affecting his activities of daily living. The patient states he played basketball he was younger he is extremely active. He's had many falls and documented meniscal tears to both of his knees. The patient has difficulty standing for long periods of time, walking for long periods of time, negotiating stairs. Patient has had cortisone injections, physical supplementation injection, he had courses of physical therapy as well as take anti-inflammatories all without relief. He feels the left knee is worse than the right. [Worsening] : worsening [9] : a current pain level of 9/10 [7] : an average pain level of 7/10 [5] : a minimum pain level of 5/10 [10] : a maximum pain level of 10/10 [Standing] : standing [Daily] : ~He/She~ states the symptoms seem to be occuring daily [Bending] : worsened by bending [Direct Pressure] : worsened by direct pressure [Lifting] : worsened by lifting [Sitting] : worsened by sitting [Running] : worsened by running [Walking] : worsened by walking [Knee Flexion] : worsened with knee flexion [Knee Extension] : worsened with knee extension [Acetaminophen] : relieved by acetaminophen [Exercise Regimen] : relieved by exercise regimen [NSAIDs] : relieved by nonsteroidal anti-inflammatory drugs [Physical Therapy] : relieved by physical therapy [Rest] : relieved by rest [None] : No relieving factors are noted [Ataxia] : no ataxia [Incontinence] : no incontinence [Loss of Dexterity] : good dexterity [Urinary Ret.] : no urinary retention

## 2019-03-26 NOTE — HISTORY OF PRESENT ILLNESS
[1] : the patient reports pain that is 1/10 in severity [Chills] : no chills [Constipation] : no constipation [Diarrhea] : no diarrhea [Dysuria] : no dysuria [Fever] : no fever [Nausea] : no nausea [Vomiting] : no vomiting [Clean/Dry/Intact] : clean, dry and intact [Healed] : healed [Erythema] : not erythematous [Discharge] : absent of discharge [Swelling] : not swollen [Dehiscence] : not dehisced [Neuro Intact] : an unremarkable neurological exam [Vascular Intact] : ~T peripheral vascular exam normal [Negative Magdaleno's] : maneuvers demonstrated a negative Magdaleno's sign [de-identified] : Status post left total hip replacement from the anterior approach on November 7, 2018 [de-identified] : On physical examination of the left hip. Patient is status post left total hip replacement from the anterior approach. There is a well-healed surgical scar with no evidence of any redness swelling or heat noted. There is no pain or tenderness on examination. He does have excellent range of motion in all planes without reproducible pain. There is no evidence of leg length discrepancy. No evidence of any muscle atrophy. No evidence of any motor or sensory deficit. Patient does have good distal pulses and no calf tenderness. [de-identified] : X-rays of the left hip revealed status post left total hip replacement. Hardware is in place until the alignment as well as fixation. There is no evidence of any fracture dislocation or loosening of any hardware. [de-identified] : Status post left total hip replacement from the anterior approach on November 7, 2018 [de-identified] : Plan for the patient is to continue with the present level of activities. This includes a good home exercise program. He is also encouraged to continue with ice moist heat as needed along with pain medication. It is suggested that he return back to the office for his 3 month postoperative visit with x-rays. However she expresses an increase in pain or discomfort to notify the office. [de-identified] : Patient is a 68-year-old gentleman who presents for follow up of both of his knees. The patient has a diagnosis of severe DJD to the left and right knee affecting his activities of daily living. The patient states he played basketball he was younger he is extremely active. He's had many falls and documented meniscal tears to both of his knees. The patient has difficulty standing for long periods of time, walking for long periods of time, negotiating stairs. Patient has had cortisone injections, physical supplementation injection, he had courses of physical therapy as well as take anti-inflammatories all without relief. He feels the left knee is worse than the right. [Worsening] : worsening [9] : a current pain level of 9/10 [7] : an average pain level of 7/10 [5] : a minimum pain level of 5/10 [10] : a maximum pain level of 10/10 [Standing] : standing [Daily] : ~He/She~ states the symptoms seem to be occuring daily [Bending] : worsened by bending [Direct Pressure] : worsened by direct pressure [Lifting] : worsened by lifting [Sitting] : worsened by sitting [Running] : worsened by running [Walking] : worsened by walking [Knee Flexion] : worsened with knee flexion [Knee Extension] : worsened with knee extension [Acetaminophen] : relieved by acetaminophen [Exercise Regimen] : relieved by exercise regimen [NSAIDs] : relieved by nonsteroidal anti-inflammatory drugs [Physical Therapy] : relieved by physical therapy [Rest] : relieved by rest [None] : No relieving factors are noted [Ataxia] : no ataxia [Incontinence] : no incontinence [Loss of Dexterity] : good dexterity [Urinary Ret.] : no urinary retention

## 2019-03-26 NOTE — PHYSICAL EXAM
[Antalgic] : antalgic [UE/LE] : Sensory: Intact in bilateral upper & lower extremities [ALL] : dorsalis pedis, posterior tibial, femoral, popliteal, and radial 2+ and symmetric bilaterally [Normal RUE] : Right Upper Extremity: No scars, rashes, lesions, ulcers, skin intact [Normal LUE] : Left Upper Extremity: No scars, rashes, lesions, ulcers, skin intact [Normal RLE] : Right Lower Extremity: No scars, rashes, lesions, ulcers, skin intact [Normal LLE] : Left Lower Extremity: No scars, rashes, lesions, ulcers, skin intact [Normal] : Alert and in no acute distress [de-identified] : Left Knee: Skin is clean, dry, and intact\par Swelling: Moderate\par Effusion: Moderate\par Alignment: Varus malalignment\par Tenderness:all 3 compartments\par ROM: Flexion: 110 deg.; Extension: -10 deg,\par Laxity: Less than 5 laxity with varus valgus tibial stress is a flexion contracture, no extension lag\par PF crepitus: Marked crepitation; associated pain\par Quadricep formation: Attenuated in Extension & Flexion:\par Quad/Ham St:5/5\par \par Right  Knee: Skin Is clean, dry, and intact.\par Swelling: Moderate\par Effusion: Moderate\par Alignment: Varus malalignment\par Tenderness: All 3 compartment\par ROM: Flexion: 115 deg.; Extension: -15deg,\par Laxity less than 5° laxity with varus valgus tibial stress\par PF crepitus: Marked crepitation; associated pain\par Quadricep formation: Attenuated in Extension & Flexion:\par Quad/Ham St: 5/5\par Calves soft, nontender, noted to DVT at this time. Patient has good motor and sensory both of his legs appear [de-identified] : intact [de-identified] : Severe tricompartmental DJD to the left and right knee with multiple subchondral cysts, periarticular osteophytes, bone-on-bone contact patellofemoral joint and medial femoral tibial compartment

## 2019-03-26 NOTE — DISCUSSION/SUMMARY
[Medication Risks Reviewed] : Medication risks reviewed [Surgical risks reviewed] : Surgical risks reviewed [de-identified] : Dr. Strange evaluated this patient and is guiding this patient's entire orthopedic management plan. The patient was advised that based upon their clinical presentation and radiographic findings they meet inclusion criteria for benefitting from a total knee arthroplasty as a treatment option for severe arthritis of the left than right stage knee.\par The spectrum of treatments for severe knee DJD were reviewed in detail. Dr. Strange reviewed in detail the goals, alternatives, risks and benefits of total knee arthroplasty. \par The patient was advised of the possible complications which are inclusive of but not exclusive to VTE-related, infectious-related, anesthetic-related, surgically-related, medically-related, and implant-related. \par The patient was advised of the goals, alternatives, risks and benefits of autologous, directed and banked blood product transfusions for perioperative blood losses.\par The patient was advised that they will require a medical preoperative risk evaluation by their PCP. Further medical subspecialty clearances such as cardiac may be indicated if felt needed by their PCP.\par The patient was advised he/she may call our office after careful consideration of their treatment options and further consultation with any other physician to begin the process of preoperative planning for elective left than right stage TKR at a time of their choosing. \par Patient needs medical clearance prior to surgery. He was educated using models and the actual implant. The patient understands we do the surgery as a staged procedure doing the left side first provided he is cleared perioperatively, we will proceed with the right knee several days later if he is not clear wishes not to proceed we will cancel the second side and 2 at a later date\par

## 2019-03-26 NOTE — DISCUSSION/SUMMARY
[Medication Risks Reviewed] : Medication risks reviewed [Surgical risks reviewed] : Surgical risks reviewed [de-identified] : Dr. Strange evaluated this patient and is guiding this patient's entire orthopedic management plan. The patient was advised that based upon their clinical presentation and radiographic findings they meet inclusion criteria for benefitting from a total knee arthroplasty as a treatment option for severe arthritis of the left than right stage knee.\par The spectrum of treatments for severe knee DJD were reviewed in detail. Dr. Strange reviewed in detail the goals, alternatives, risks and benefits of total knee arthroplasty. \par The patient was advised of the possible complications which are inclusive of but not exclusive to VTE-related, infectious-related, anesthetic-related, surgically-related, medically-related, and implant-related. \par The patient was advised of the goals, alternatives, risks and benefits of autologous, directed and banked blood product transfusions for perioperative blood losses.\par The patient was advised that they will require a medical preoperative risk evaluation by their PCP. Further medical subspecialty clearances such as cardiac may be indicated if felt needed by their PCP.\par The patient was advised he/she may call our office after careful consideration of their treatment options and further consultation with any other physician to begin the process of preoperative planning for elective left than right stage TKR at a time of their choosing. \par Patient needs medical clearance prior to surgery. He was educated using models and the actual implant. The patient understands we do the surgery as a staged procedure doing the left side first provided he is cleared perioperatively, we will proceed with the right knee several days later if he is not clear wishes not to proceed we will cancel the second side and 2 at a later date\par

## 2019-03-26 NOTE — PHYSICAL EXAM
[Antalgic] : antalgic [UE/LE] : Sensory: Intact in bilateral upper & lower extremities [ALL] : dorsalis pedis, posterior tibial, femoral, popliteal, and radial 2+ and symmetric bilaterally [Normal RUE] : Right Upper Extremity: No scars, rashes, lesions, ulcers, skin intact [Normal LUE] : Left Upper Extremity: No scars, rashes, lesions, ulcers, skin intact [Normal RLE] : Right Lower Extremity: No scars, rashes, lesions, ulcers, skin intact [Normal LLE] : Left Lower Extremity: No scars, rashes, lesions, ulcers, skin intact [Normal] : Oriented to person, place, and time, insight and judgement were intact and the affect was normal [de-identified] : Left Knee: Skin is clean, dry, and intact\par Swelling: Moderate\par Effusion: Moderate\par Alignment: Varus malalignment\par Tenderness: all 3 compartments\par ROM: Flexion: 1:15 deg.; Extension: -10 deg, 10° flexion contracture, no extension lag\par Laxity less than 5° laxity with varus valgus tibial stress\par PF crepitus: Marked; associated pain\par Quadricep formation: Attenuated in Extension & Flexion:\par Quad/Ham St:5/5\par \par Right  Knee: Skin clean, dry, and intact\par Swelling: Moderate\par Effusion: Moderate\par Alignment: Varus malalignment\par Tenderness:all 3 compartments\par ROM: Flexion: 120 deg.; Extension: -20deg, there is a positive flexion contracture, no extension lag\par Laxity: Less than 5° laxity with varus valgus tibial stress\par PF crepitus: Marked crepitation; associated pain\par Quadricep formation: Attenuated in Extension & Flexion:\par Quad/Ham St: 5/5\par Patient has full range of motion of both his hips without pain, discomfort, and stability. Good motor and sensory both of his legs. Calves are soft, nontender, no evident of DVT at this time. Patient has good motor and sensory\par  [de-identified] : intact [de-identified] : Severe tricompartmental DJD to the right and left knee with multiple subchondral cysts, periarticular osteophytes, bone-on-bone contact medial femoral tibia compartments and the patellofemoral joints bilaterally no gross evidence of AVN

## 2019-03-26 NOTE — HISTORY OF PRESENT ILLNESS
[de-identified] : The patient is a 68-year-old gentleman who is a history of a left total hip replacement anterior approach doing exceptionally well presents with severe debilitating bilateral knee pain affecting his activities of daily living. The patient has difficulty standing for long periods of time, and walking for long periods of time. Over the past several months, the patient had cortisone injections, physical therapy sessions, Visco supplementation, and is taking anti-inflammatories all without relief. The patient feels the left knee is worse than the right. The patient would like to pursue a definitive procedure for both knees [Worsening] : worsening [8] : an average pain level of 8/10 [4] : a minimum pain level of 4/10 [10] : a maximum pain level of 10/10 [Walking] : walking [Standing] : standing [Lifting] : lifting [Daily] : ~He/She~ states the symptoms seem to be occuring daily [Bending] : worsened by bending [Direct Pressure] : worsened by direct pressure [Sitting] : worsened by sitting [Running] : worsened by running [Acetaminophen] : relieved by acetaminophen [Acupuncture] : relieved by acupuncture [Chiropractic] : not relieved by chiropractic manipulation [Exercise Regimen] : relieved by exercise regimen [Ice] : relieved by ice [NSAIDs] : relieved by nonsteroidal anti-inflammatory drugs [Physical Therapy] : relieved by physical therapy [Recumbency] : relieved by recumbency [Rest] : relieved by rest [None] : No relieving factors are noted [Ataxia] : no ataxia [Incontinence] : no incontinence [Loss of Dexterity] : good dexterity [Urinary Ret.] : no urinary retention

## 2019-04-09 ENCOUNTER — OUTPATIENT (OUTPATIENT)
Dept: OUTPATIENT SERVICES | Facility: HOSPITAL | Age: 69
LOS: 1 days | End: 2019-04-09
Payer: MEDICARE

## 2019-04-09 VITALS
DIASTOLIC BLOOD PRESSURE: 75 MMHG | HEIGHT: 72 IN | TEMPERATURE: 98 F | HEART RATE: 70 BPM | OXYGEN SATURATION: 98 % | WEIGHT: 212.97 LBS | RESPIRATION RATE: 16 BRPM | SYSTOLIC BLOOD PRESSURE: 119 MMHG

## 2019-04-09 DIAGNOSIS — Z01.818 ENCOUNTER FOR OTHER PREPROCEDURAL EXAMINATION: ICD-10-CM

## 2019-04-09 DIAGNOSIS — Z98.890 OTHER SPECIFIED POSTPROCEDURAL STATES: Chronic | ICD-10-CM

## 2019-04-09 DIAGNOSIS — C20 MALIGNANT NEOPLASM OF RECTUM: Chronic | ICD-10-CM

## 2019-04-09 DIAGNOSIS — Z96.642 PRESENCE OF LEFT ARTIFICIAL HIP JOINT: Chronic | ICD-10-CM

## 2019-04-09 DIAGNOSIS — Z90.79 ACQUIRED ABSENCE OF OTHER GENITAL ORGAN(S): Chronic | ICD-10-CM

## 2019-04-09 DIAGNOSIS — M17.0 BILATERAL PRIMARY OSTEOARTHRITIS OF KNEE: ICD-10-CM

## 2019-04-09 DIAGNOSIS — M17.12 UNILATERAL PRIMARY OSTEOARTHRITIS, LEFT KNEE: ICD-10-CM

## 2019-04-09 LAB
ALBUMIN SERPL ELPH-MCNC: 4.1 G/DL — SIGNIFICANT CHANGE UP (ref 3.3–5)
ALP SERPL-CCNC: 80 U/L — SIGNIFICANT CHANGE UP (ref 30–120)
ALT FLD-CCNC: 36 U/L DA — SIGNIFICANT CHANGE UP (ref 10–60)
ANION GAP SERPL CALC-SCNC: 12 MMOL/L — SIGNIFICANT CHANGE UP (ref 5–17)
APTT BLD: 34.5 SEC — SIGNIFICANT CHANGE UP (ref 28.5–37)
AST SERPL-CCNC: 25 U/L — SIGNIFICANT CHANGE UP (ref 10–40)
BILIRUB SERPL-MCNC: 0.8 MG/DL — SIGNIFICANT CHANGE UP (ref 0.2–1.2)
BLD GP AB SCN SERPL QL: SIGNIFICANT CHANGE UP
BUN SERPL-MCNC: 22 MG/DL — SIGNIFICANT CHANGE UP (ref 7–23)
CALCIUM SERPL-MCNC: 9.6 MG/DL — SIGNIFICANT CHANGE UP (ref 8.4–10.5)
CHLORIDE SERPL-SCNC: 108 MMOL/L — SIGNIFICANT CHANGE UP (ref 96–108)
CO2 SERPL-SCNC: 24 MMOL/L — SIGNIFICANT CHANGE UP (ref 22–31)
CREAT SERPL-MCNC: 1.26 MG/DL — SIGNIFICANT CHANGE UP (ref 0.5–1.3)
GLUCOSE SERPL-MCNC: 101 MG/DL — HIGH (ref 70–99)
HCT VFR BLD CALC: 43.4 % — SIGNIFICANT CHANGE UP (ref 39–50)
HGB BLD-MCNC: 14.4 G/DL — SIGNIFICANT CHANGE UP (ref 13–17)
INR BLD: 1.02 RATIO — SIGNIFICANT CHANGE UP (ref 0.88–1.16)
MCHC RBC-ENTMCNC: 29.3 PG — SIGNIFICANT CHANGE UP (ref 27–34)
MCHC RBC-ENTMCNC: 33.2 GM/DL — SIGNIFICANT CHANGE UP (ref 32–36)
MCV RBC AUTO: 88.2 FL — SIGNIFICANT CHANGE UP (ref 80–100)
NRBC # BLD: 0 /100 WBCS — SIGNIFICANT CHANGE UP (ref 0–0)
PLATELET # BLD AUTO: 175 K/UL — SIGNIFICANT CHANGE UP (ref 150–400)
POTASSIUM SERPL-MCNC: 4.7 MMOL/L — SIGNIFICANT CHANGE UP (ref 3.5–5.3)
POTASSIUM SERPL-SCNC: 4.7 MMOL/L — SIGNIFICANT CHANGE UP (ref 3.5–5.3)
PROT SERPL-MCNC: 7.7 G/DL — SIGNIFICANT CHANGE UP (ref 6–8.3)
PROTHROM AB SERPL-ACNC: 11.1 SEC — SIGNIFICANT CHANGE UP (ref 10–12.9)
RBC # BLD: 4.92 M/UL — SIGNIFICANT CHANGE UP (ref 4.2–5.8)
RBC # FLD: 12.9 % — SIGNIFICANT CHANGE UP (ref 10.3–14.5)
SODIUM SERPL-SCNC: 144 MMOL/L — SIGNIFICANT CHANGE UP (ref 135–145)
WBC # BLD: 5.09 K/UL — SIGNIFICANT CHANGE UP (ref 3.8–10.5)
WBC # FLD AUTO: 5.09 K/UL — SIGNIFICANT CHANGE UP (ref 3.8–10.5)

## 2019-04-09 PROCEDURE — 85027 COMPLETE CBC AUTOMATED: CPT

## 2019-04-09 PROCEDURE — 87640 STAPH A DNA AMP PROBE: CPT

## 2019-04-09 PROCEDURE — G0463: CPT

## 2019-04-09 PROCEDURE — 86900 BLOOD TYPING SEROLOGIC ABO: CPT

## 2019-04-09 PROCEDURE — 85730 THROMBOPLASTIN TIME PARTIAL: CPT

## 2019-04-09 PROCEDURE — 93005 ELECTROCARDIOGRAM TRACING: CPT

## 2019-04-09 PROCEDURE — 36415 COLL VENOUS BLD VENIPUNCTURE: CPT

## 2019-04-09 PROCEDURE — 80053 COMPREHEN METABOLIC PANEL: CPT

## 2019-04-09 PROCEDURE — 93010 ELECTROCARDIOGRAM REPORT: CPT

## 2019-04-09 PROCEDURE — 85610 PROTHROMBIN TIME: CPT

## 2019-04-09 PROCEDURE — 86850 RBC ANTIBODY SCREEN: CPT

## 2019-04-09 PROCEDURE — 86901 BLOOD TYPING SEROLOGIC RH(D): CPT

## 2019-04-09 NOTE — H&P PST ADULT - HISTORY OF PRESENT ILLNESS
69 y/o male with bilateral knee pain. Denies any acute injury/trauma. Takes advil/tylenol as needed with some relief. 69 y/o male with bilateral knee pain. Denies any acute injury/trauma. Takes advil/tylenol as needed with some relief.Scheduled for staged knee replacement in NAD

## 2019-04-09 NOTE — H&P PST ADULT - MUSCULOSKELETAL
details… No joint pain, swelling or deformity; no limitation of movement detailed exam knees/decreased ROM/joint swelling

## 2019-04-09 NOTE — H&P PST ADULT - NSICDXFAMILYHX_GEN_ALL_CORE_FT
FAMILY HISTORY:  Family history of DVT, brother living    Father  Still living? No  Family history of diabetes mellitus, Age at diagnosis: Age Unknown  Family history of prostate cancer in father, Age at diagnosis: Age Unknown    Mother  Still living? No  Family history of breast cancer, Age at diagnosis: Age Unknown    Sibling  Still living? Yes, Estimated age: 61-70  Family history of hypertension in brother, Age at diagnosis: Age Unknown

## 2019-04-09 NOTE — H&P PST ADULT - NSICDXPROBLEM_GEN_ALL_CORE_FT
PROBLEM DIAGNOSES  Problem: Arthritis of both knees  Assessment and Plan: left total knee replacement stage 1 on 4/24/19  medical clearance  cardiac clearance

## 2019-04-09 NOTE — H&P PST ADULT - NSICDXPASTSURGICALHX_GEN_ALL_CORE_FT
PAST SURGICAL HISTORY:  Rectal cancer 2004 tumor removed    S/P hip replacement, left 11/2018    S/P insertion of penile implant 2013    S/P knee surgery bilateral knees arthroscopy    S/P prostatectomy 2008

## 2019-04-09 NOTE — H&P PST ADULT - NSICDXPASTMEDICALHX_GEN_ALL_CORE_FT
PAST MEDICAL HISTORY:  Aortic aneurysm 4.4 cm-being observed, no changes    GERD (gastroesophageal reflux disease)     Osteoarthritis     Prostate cancer 2008,    Rectal cancer 2004

## 2019-04-10 PROBLEM — I71.9 AORTIC ANEURYSM OF UNSPECIFIED SITE, WITHOUT RUPTURE: Chronic | Status: ACTIVE | Noted: 2018-10-24

## 2019-04-10 PROBLEM — C20 MALIGNANT NEOPLASM OF RECTUM: Chronic | Status: ACTIVE | Noted: 2018-10-24

## 2019-04-10 PROBLEM — C61 MALIGNANT NEOPLASM OF PROSTATE: Chronic | Status: ACTIVE | Noted: 2018-10-24

## 2019-04-10 LAB
MRSA PCR RESULT.: SIGNIFICANT CHANGE UP
S AUREUS DNA NOSE QL NAA+PROBE: SIGNIFICANT CHANGE UP

## 2019-04-17 RX ORDER — SODIUM CHLORIDE 9 MG/ML
1000 INJECTION, SOLUTION INTRAVENOUS
Qty: 0 | Refills: 0 | Status: DISCONTINUED | OUTPATIENT
Start: 2019-04-24 | End: 2019-04-29

## 2019-04-17 RX ORDER — SENNA PLUS 8.6 MG/1
2 TABLET ORAL AT BEDTIME
Qty: 0 | Refills: 0 | Status: DISCONTINUED | OUTPATIENT
Start: 2019-04-24 | End: 2019-04-29

## 2019-04-17 RX ORDER — DOCUSATE SODIUM 100 MG
100 CAPSULE ORAL THREE TIMES A DAY
Qty: 0 | Refills: 0 | Status: DISCONTINUED | OUTPATIENT
Start: 2019-04-24 | End: 2019-04-29

## 2019-04-17 RX ORDER — MAGNESIUM HYDROXIDE 400 MG/1
30 TABLET, CHEWABLE ORAL DAILY
Qty: 0 | Refills: 0 | Status: DISCONTINUED | OUTPATIENT
Start: 2019-04-24 | End: 2019-04-29

## 2019-04-17 RX ORDER — POLYETHYLENE GLYCOL 3350 17 G/17G
17 POWDER, FOR SOLUTION ORAL DAILY
Qty: 0 | Refills: 0 | Status: DISCONTINUED | OUTPATIENT
Start: 2019-04-24 | End: 2019-04-29

## 2019-04-17 RX ORDER — ONDANSETRON 8 MG/1
4 TABLET, FILM COATED ORAL EVERY 6 HOURS
Qty: 0 | Refills: 0 | Status: DISCONTINUED | OUTPATIENT
Start: 2019-04-24 | End: 2019-04-29

## 2019-04-17 RX ORDER — PANTOPRAZOLE SODIUM 20 MG/1
40 TABLET, DELAYED RELEASE ORAL
Qty: 0 | Refills: 0 | Status: DISCONTINUED | OUTPATIENT
Start: 2019-04-24 | End: 2019-04-29

## 2019-04-24 ENCOUNTER — APPOINTMENT (OUTPATIENT)
Dept: ORTHOPEDIC SURGERY | Facility: HOSPITAL | Age: 69
End: 2019-04-24

## 2019-04-24 ENCOUNTER — RESULT REVIEW (OUTPATIENT)
Age: 69
End: 2019-04-24

## 2019-04-24 ENCOUNTER — INPATIENT (INPATIENT)
Facility: HOSPITAL | Age: 69
LOS: 6 days | Discharge: HOME | DRG: 462 | End: 2019-05-01
Attending: ORTHOPAEDIC SURGERY | Admitting: ORTHOPAEDIC SURGERY
Payer: MEDICARE

## 2019-04-24 VITALS
WEIGHT: 211.86 LBS | TEMPERATURE: 99 F | HEART RATE: 70 BPM | DIASTOLIC BLOOD PRESSURE: 79 MMHG | HEIGHT: 73 IN | SYSTOLIC BLOOD PRESSURE: 125 MMHG | RESPIRATION RATE: 16 BRPM | OXYGEN SATURATION: 100 %

## 2019-04-24 DIAGNOSIS — Z98.890 OTHER SPECIFIED POSTPROCEDURAL STATES: Chronic | ICD-10-CM

## 2019-04-24 DIAGNOSIS — M17.12 UNILATERAL PRIMARY OSTEOARTHRITIS, LEFT KNEE: ICD-10-CM

## 2019-04-24 DIAGNOSIS — Z96.642 PRESENCE OF LEFT ARTIFICIAL HIP JOINT: Chronic | ICD-10-CM

## 2019-04-24 DIAGNOSIS — C20 MALIGNANT NEOPLASM OF RECTUM: Chronic | ICD-10-CM

## 2019-04-24 DIAGNOSIS — Z90.79 ACQUIRED ABSENCE OF OTHER GENITAL ORGAN(S): Chronic | ICD-10-CM

## 2019-04-24 LAB
ANION GAP SERPL CALC-SCNC: 13 MMOL/L — SIGNIFICANT CHANGE UP (ref 5–17)
BUN SERPL-MCNC: 21 MG/DL — SIGNIFICANT CHANGE UP (ref 7–23)
CALCIUM SERPL-MCNC: 8.6 MG/DL — SIGNIFICANT CHANGE UP (ref 8.4–10.5)
CHLORIDE SERPL-SCNC: 105 MMOL/L — SIGNIFICANT CHANGE UP (ref 96–108)
CO2 SERPL-SCNC: 22 MMOL/L — SIGNIFICANT CHANGE UP (ref 22–31)
CREAT SERPL-MCNC: 1.18 MG/DL — SIGNIFICANT CHANGE UP (ref 0.5–1.3)
GLUCOSE SERPL-MCNC: 127 MG/DL — HIGH (ref 70–99)
HCT VFR BLD CALC: 40.7 % — SIGNIFICANT CHANGE UP (ref 39–50)
HGB BLD-MCNC: 13.6 G/DL — SIGNIFICANT CHANGE UP (ref 13–17)
POTASSIUM SERPL-MCNC: 3.8 MMOL/L — SIGNIFICANT CHANGE UP (ref 3.5–5.3)
POTASSIUM SERPL-SCNC: 3.8 MMOL/L — SIGNIFICANT CHANGE UP (ref 3.5–5.3)
SODIUM SERPL-SCNC: 140 MMOL/L — SIGNIFICANT CHANGE UP (ref 135–145)

## 2019-04-24 PROCEDURE — 27447 TOTAL KNEE ARTHROPLASTY: CPT | Mod: LT

## 2019-04-24 PROCEDURE — 73562 X-RAY EXAM OF KNEE 3: CPT | Mod: 26,LT

## 2019-04-24 PROCEDURE — 88305 TISSUE EXAM BY PATHOLOGIST: CPT | Mod: 26

## 2019-04-24 PROCEDURE — 20985 CPTR-ASST DIR MS PX: CPT | Mod: LT

## 2019-04-24 PROCEDURE — 99223 1ST HOSP IP/OBS HIGH 75: CPT

## 2019-04-24 PROCEDURE — 88311 DECALCIFY TISSUE: CPT | Mod: 26

## 2019-04-24 RX ORDER — ACETAMINOPHEN 500 MG
1000 TABLET ORAL ONCE
Qty: 0 | Refills: 0 | Status: COMPLETED | OUTPATIENT
Start: 2019-04-24 | End: 2019-04-24

## 2019-04-24 RX ORDER — OXYCODONE HYDROCHLORIDE 5 MG/1
5 TABLET ORAL
Qty: 0 | Refills: 0 | Status: DISCONTINUED | OUTPATIENT
Start: 2019-04-24 | End: 2019-04-25

## 2019-04-24 RX ORDER — ENOXAPARIN SODIUM 100 MG/ML
30 INJECTION SUBCUTANEOUS EVERY 12 HOURS
Qty: 0 | Refills: 0 | Status: COMPLETED | OUTPATIENT
Start: 2019-04-25 | End: 2019-04-28

## 2019-04-24 RX ORDER — TRANEXAMIC ACID 100 MG/ML
1000 INJECTION, SOLUTION INTRAVENOUS ONCE
Qty: 0 | Refills: 0 | Status: COMPLETED | OUTPATIENT
Start: 2019-04-24 | End: 2019-04-24

## 2019-04-24 RX ORDER — CELECOXIB 200 MG/1
200 CAPSULE ORAL
Qty: 0 | Refills: 0 | Status: COMPLETED | OUTPATIENT
Start: 2019-04-24 | End: 2019-04-28

## 2019-04-24 RX ORDER — SODIUM CHLORIDE 9 MG/ML
1000 INJECTION, SOLUTION INTRAVENOUS
Qty: 0 | Refills: 0 | Status: DISCONTINUED | OUTPATIENT
Start: 2019-04-24 | End: 2019-04-24

## 2019-04-24 RX ORDER — ACETAMINOPHEN 500 MG
1000 TABLET ORAL EVERY 6 HOURS
Qty: 0 | Refills: 0 | Status: COMPLETED | OUTPATIENT
Start: 2019-04-24 | End: 2019-04-25

## 2019-04-24 RX ORDER — HYDROMORPHONE HYDROCHLORIDE 2 MG/ML
0.5 INJECTION INTRAMUSCULAR; INTRAVENOUS; SUBCUTANEOUS
Qty: 0 | Refills: 0 | Status: DISCONTINUED | OUTPATIENT
Start: 2019-04-24 | End: 2019-04-29

## 2019-04-24 RX ORDER — OXYCODONE HYDROCHLORIDE 5 MG/1
10 TABLET ORAL
Qty: 0 | Refills: 0 | Status: DISCONTINUED | OUTPATIENT
Start: 2019-04-24 | End: 2019-04-25

## 2019-04-24 RX ORDER — APREPITANT 80 MG/1
40 CAPSULE ORAL ONCE
Qty: 0 | Refills: 0 | Status: COMPLETED | OUTPATIENT
Start: 2019-04-24 | End: 2019-04-24

## 2019-04-24 RX ORDER — CEFAZOLIN SODIUM 1 G
2000 VIAL (EA) INJECTION ONCE
Qty: 0 | Refills: 0 | Status: COMPLETED | OUTPATIENT
Start: 2019-04-24 | End: 2019-04-24

## 2019-04-24 RX ORDER — ONDANSETRON 8 MG/1
4 TABLET, FILM COATED ORAL ONCE
Qty: 0 | Refills: 0 | Status: DISCONTINUED | OUTPATIENT
Start: 2019-04-24 | End: 2019-04-24

## 2019-04-24 RX ORDER — CHLORHEXIDINE GLUCONATE 213 G/1000ML
1 SOLUTION TOPICAL ONCE
Qty: 0 | Refills: 0 | Status: COMPLETED | OUTPATIENT
Start: 2019-04-24 | End: 2019-04-24

## 2019-04-24 RX ORDER — CEFAZOLIN SODIUM 1 G
2000 VIAL (EA) INJECTION EVERY 8 HOURS
Qty: 0 | Refills: 0 | Status: COMPLETED | OUTPATIENT
Start: 2019-04-24 | End: 2019-04-25

## 2019-04-24 RX ORDER — HYDROMORPHONE HYDROCHLORIDE 2 MG/ML
0.5 INJECTION INTRAMUSCULAR; INTRAVENOUS; SUBCUTANEOUS
Qty: 0 | Refills: 0 | Status: DISCONTINUED | OUTPATIENT
Start: 2019-04-24 | End: 2019-04-24

## 2019-04-24 RX ORDER — ACETAMINOPHEN 500 MG
1000 TABLET ORAL EVERY 8 HOURS
Qty: 0 | Refills: 0 | Status: DISCONTINUED | OUTPATIENT
Start: 2019-04-25 | End: 2019-04-29

## 2019-04-24 RX ADMIN — CELECOXIB 200 MILLIGRAM(S): 200 CAPSULE ORAL at 21:07

## 2019-04-24 RX ADMIN — OXYCODONE HYDROCHLORIDE 10 MILLIGRAM(S): 5 TABLET ORAL at 21:40

## 2019-04-24 RX ADMIN — ONDANSETRON 4 MILLIGRAM(S): 8 TABLET, FILM COATED ORAL at 17:24

## 2019-04-24 RX ADMIN — Medication 1000 MILLIGRAM(S): at 19:06

## 2019-04-24 RX ADMIN — SODIUM CHLORIDE 100 MILLILITER(S): 9 INJECTION, SOLUTION INTRAVENOUS at 15:15

## 2019-04-24 RX ADMIN — Medication 400 MILLIGRAM(S): at 18:34

## 2019-04-24 RX ADMIN — APREPITANT 40 MILLIGRAM(S): 80 CAPSULE ORAL at 10:46

## 2019-04-24 RX ADMIN — SODIUM CHLORIDE 125 MILLILITER(S): 9 INJECTION, SOLUTION INTRAVENOUS at 17:23

## 2019-04-24 RX ADMIN — SENNA PLUS 2 TABLET(S): 8.6 TABLET ORAL at 21:07

## 2019-04-24 RX ADMIN — Medication 100 MILLIGRAM(S): at 20:48

## 2019-04-24 RX ADMIN — CHLORHEXIDINE GLUCONATE 1 APPLICATION(S): 213 SOLUTION TOPICAL at 10:48

## 2019-04-24 RX ADMIN — OXYCODONE HYDROCHLORIDE 10 MILLIGRAM(S): 5 TABLET ORAL at 21:07

## 2019-04-24 RX ADMIN — Medication 100 MILLIGRAM(S): at 21:07

## 2019-04-24 NOTE — PROGRESS NOTE ADULT - SUBJECTIVE AND OBJECTIVE BOX
Orthopaedic Post Op Note    Procedure: Left TKR  Surgeon: Yvonne Strange    68y Male comfortable, without complaints. Reported pain score = 2  Denies N/V, CP, SOB, numbness/tingling of extremities.    PE:  Vital Signs Last 24 Hrs  T(C): 36.9 (24 Apr 2019 14:26), Max: 37.3 (24 Apr 2019 10:41)  T(F): 98.4 (24 Apr 2019 14:26), Max: 99.1 (24 Apr 2019 10:41)  HR: 74 (24 Apr 2019 16:50) (56 - 92)  BP: 102/64 (24 Apr 2019 16:50) (100/70 - 125/79)  RR: 16 (24 Apr 2019 16:50) (10 - 22)  SpO2: 95% (24 Apr 2019 16:50) (95% - 100%)  General: Pt alert and oriented   Lungs: + BS CTA bilaterally  Heart: +S1 & S2 heard, RRR  Abd: + BS heard, soft, NT, ND  Left Knee Dressing: C/D/I, functioning Hemovac drain in place  Bilateral LEs:  Motor:   5/5 dorsiflexion, plantarflexion, EHL  Sensation intact to LT   2+ DP Pulses  SCDs in place    Post Op labs:  24 Apr 2019 16:11    140    |  105    |  21     ----------------------------<  127    3.8     |  22     |  1.18     Ca    8.6        24 Apr 2019 16:11                            13.6   x     )-----------( x        ( 24 Apr 2019 16:11 )             40.7       A/P: 68y Male POD#0 s/p Left TKR  - Stable  - Acetaminophen, Celebrex, Dilaudid/Oxycodone for Pain Control   - DVT ppx: Lovenox 30mg q 12h  - Jazmin op IV abx: Ancef  - PT, OT per protocol  - F/U AM Labs  Patient is staged for Right TKR 4/29/19

## 2019-04-24 NOTE — CONSULT NOTE ADULT - ASSESSMENT
68M with Aortic Aneurysm, GERD, OA and HLD admitted for staged procedure of bilateral Knees.     S/P TKR - Left POD 0  Continue Bowel regimen and pain control regimen. Incentive Spirometer for lung expansion. Work with PT to increase ambulation as per orthopedics. Orthopedics on board.  Monitor Hgb and follow up electrolytes.     Aortic Aneurysm  Metoprolol to reduce shearing forces and control BP    HLD  Resume statin    GERD  Protonix    History of Prostate CA and Rectal Cancer  Observe    Diet  Regular    DVT Prophylaxis  Lovenox    Disposition  Full Code/Inpatient  Discharge planning pending hospital course

## 2019-04-24 NOTE — CONSULT NOTE ADULT - SUBJECTIVE AND OBJECTIVE BOX
68M with Aortic Aneurysm, GERD, OA and HLD has been combatting pain in both knees for several years which has progressively worsened.  Patient has tried multiple options for pain relief including OTC medication and as well as PT with minimal relief and has opted to under go elective replacement of both knees.  Patient has had successful replacement of his left knee and will be having staged procedure for his right knee as well. He is currently resting in bed comfortable with good pain control.     REVIEW OF SYSTEMS:  CONSTITUTIONAL: No fever, weight loss, or fatigue  EYES: No eye pain, visual disturbances, or discharge  ENMT:  No difficulty hearing, tinnitus, vertigo; No sinus or throat pain  NECK: No pain or stiffness  RESPIRATORY: No cough, wheezing, chills or hemoptysis; No shortness of breath  CARDIOVASCULAR: No chest pain, palpitations, dizziness, or leg swelling  GASTROINTESTINAL: No abdominal or epigastric pain. No nausea, vomiting, or hematemesis; No diarrhea or constipation. No melena or hematochezia.  GENITOURINARY: No dysuria, frequency, hematuria, or incontinence  NEUROLOGICAL: No headaches, memory loss, loss of strength, numbness, or tremors  MUSCULOSKELETAL: No muscle or back pain      PAST MEDICAL & SURGICAL HISTORY:  Rectal cancer: 2004  Osteoarthritis  Prostate cancer: 2008,  Aortic aneurysm: 4.4 cm-being observed, no changes  GERD (gastroesophageal reflux disease)  S/P hip replacement, left: 11/2018  S/P insertion of penile implant: 2013  Rectal cancer: 2004 tumor removed  S/P prostatectomy: 2008  S/P knee surgery: bilateral knees arthroscopy      SOCIAL HISTORY:  Tobacco; EtOH; Illicit Drugs    Allergies    No Known Allergies    Intolerances        MEDICATIONS  (STANDING):  acetaminophen  IVPB .. 1000 milliGRAM(s) IV Intermittent every 6 hours  ceFAZolin   IVPB 2000 milliGRAM(s) IV Intermittent every 8 hours  celecoxib 200 milliGRAM(s) Oral <User Schedule>  docusate sodium 100 milliGRAM(s) Oral three times a day  lactated ringers. 1000 milliLiter(s) (125 mL/Hr) IV Continuous <Continuous>  pantoprazole    Tablet 40 milliGRAM(s) Oral before breakfast  senna 2 Tablet(s) Oral at bedtime    MEDICATIONS  (PRN):  aluminum hydroxide/magnesium hydroxide/simethicone Suspension 30 milliLiter(s) Oral four times a day PRN Indigestion  HYDROmorphone  Injectable 0.5 milliGRAM(s) IV Push every 3 hours PRN Severe Pain (7 - 10)  magnesium hydroxide Suspension 30 milliLiter(s) Oral daily PRN Constipation  ondansetron Injectable 4 milliGRAM(s) IV Push every 6 hours PRN Nausea and/or Vomiting  oxyCODONE    IR 5 milliGRAM(s) Oral every 3 hours PRN Mild Pain (1 - 3)  oxyCODONE    IR 10 milliGRAM(s) Oral every 3 hours PRN Moderate Pain (4 - 6)  polyethylene glycol 3350 17 Gram(s) Oral daily PRN Constipation      FAMILY HISTORY:  Family history of DVT: brother living  Family history of hypertension in brother (Sibling)  Family history of breast cancer (Mother)  Family history of diabetes mellitus (Father)  Family history of prostate cancer in father (Father)      Vital Signs Last 24 Hrs  T(C): 36.9 (24 Apr 2019 14:26), Max: 37.3 (24 Apr 2019 10:41)  T(F): 98.4 (24 Apr 2019 14:26), Max: 99.1 (24 Apr 2019 10:41)  HR: 74 (24 Apr 2019 16:50) (56 - 92)  BP: 102/64 (24 Apr 2019 16:50) (100/70 - 125/79)  BP(mean): --  RR: 16 (24 Apr 2019 16:50) (10 - 22)  SpO2: 95% (24 Apr 2019 16:50) (95% - 100%)    PHYSICAL EXAM:    GENERAL: NAD, well-developed  HEAD:  Atraumatic, Normocephalic  EYES: EOMI, PERRLA, conjunctiva and sclera clear  ENMT: No tonsillar erythema, exudates, or enlargement; Moist mucous membranes, Good dentition, No lesions  NECK: Supple, No JVD, Normal thyroid  NERVOUS SYSTEM:  Alert & Oriented X3, Good concentration; Moving all 4 extremities; No gross sensory deficits  CHEST/LUNG: Clear to auscultation bilaterally; No rales, rhonchi, wheezing, or rubs  HEART: Regular rate and rhythm; No murmurs, rubs, or gallops  ABDOMEN: Soft, Nontender, Nondistended; Bowel sounds present  EXTREMITIES:  2+ Peripheral Pulses, No clubbing, cyanosis, or edema      LABS:                        13.6   x     )-----------( x        ( 24 Apr 2019 16:11 )             40.7     04-24    140  |  105  |  21  ----------------------------<  127<H>  3.8   |  22  |  1.18    Ca    8.6      24 Apr 2019 16:11          CAPILLARY BLOOD GLUCOSE          RADIOLOGY & ADDITIONAL STUDIES:    EKG:   Personally Reviewed:  [ ] YES     Imaging:   Personally Reviewed:  [ ] YES     Consultant(s) Notes Reviewed:      Care Discussed with Consultants/Other Providers:

## 2019-04-25 LAB
ANION GAP SERPL CALC-SCNC: 10 MMOL/L — SIGNIFICANT CHANGE UP (ref 5–17)
BUN SERPL-MCNC: 21 MG/DL — SIGNIFICANT CHANGE UP (ref 7–23)
CALCIUM SERPL-MCNC: 8.9 MG/DL — SIGNIFICANT CHANGE UP (ref 8.4–10.5)
CHLORIDE SERPL-SCNC: 104 MMOL/L — SIGNIFICANT CHANGE UP (ref 96–108)
CO2 SERPL-SCNC: 24 MMOL/L — SIGNIFICANT CHANGE UP (ref 22–31)
CREAT SERPL-MCNC: 1.19 MG/DL — SIGNIFICANT CHANGE UP (ref 0.5–1.3)
GLUCOSE SERPL-MCNC: 149 MG/DL — HIGH (ref 70–99)
HCT VFR BLD CALC: 37.1 % — LOW (ref 39–50)
HGB BLD-MCNC: 12.5 G/DL — LOW (ref 13–17)
MCHC RBC-ENTMCNC: 29.4 PG — SIGNIFICANT CHANGE UP (ref 27–34)
MCHC RBC-ENTMCNC: 33.7 GM/DL — SIGNIFICANT CHANGE UP (ref 32–36)
MCV RBC AUTO: 87.3 FL — SIGNIFICANT CHANGE UP (ref 80–100)
NRBC # BLD: 0 /100 WBCS — SIGNIFICANT CHANGE UP (ref 0–0)
PLATELET # BLD AUTO: 153 K/UL — SIGNIFICANT CHANGE UP (ref 150–400)
POTASSIUM SERPL-MCNC: 4.3 MMOL/L — SIGNIFICANT CHANGE UP (ref 3.5–5.3)
POTASSIUM SERPL-SCNC: 4.3 MMOL/L — SIGNIFICANT CHANGE UP (ref 3.5–5.3)
RBC # BLD: 4.25 M/UL — SIGNIFICANT CHANGE UP (ref 4.2–5.8)
RBC # FLD: 12.9 % — SIGNIFICANT CHANGE UP (ref 10.3–14.5)
SODIUM SERPL-SCNC: 138 MMOL/L — SIGNIFICANT CHANGE UP (ref 135–145)
WBC # BLD: 16.5 K/UL — HIGH (ref 3.8–10.5)
WBC # FLD AUTO: 16.5 K/UL — HIGH (ref 3.8–10.5)

## 2019-04-25 PROCEDURE — 99233 SBSQ HOSP IP/OBS HIGH 50: CPT

## 2019-04-25 RX ORDER — TRAMADOL HYDROCHLORIDE 50 MG/1
50 TABLET ORAL EVERY 6 HOURS
Qty: 0 | Refills: 0 | Status: DISCONTINUED | OUTPATIENT
Start: 2019-04-25 | End: 2019-04-26

## 2019-04-25 RX ORDER — METOPROLOL TARTRATE 50 MG
25 TABLET ORAL DAILY
Qty: 0 | Refills: 0 | Status: DISCONTINUED | OUTPATIENT
Start: 2019-04-25 | End: 2019-04-29

## 2019-04-25 RX ORDER — ATORVASTATIN CALCIUM 80 MG/1
40 TABLET, FILM COATED ORAL AT BEDTIME
Qty: 0 | Refills: 0 | Status: DISCONTINUED | OUTPATIENT
Start: 2019-04-25 | End: 2019-04-27

## 2019-04-25 RX ORDER — ASPIRIN/CALCIUM CARB/MAGNESIUM 324 MG
81 TABLET ORAL ONCE
Qty: 0 | Refills: 0 | Status: COMPLETED | OUTPATIENT
Start: 2019-04-25 | End: 2019-04-25

## 2019-04-25 RX ORDER — ASPIRIN/CALCIUM CARB/MAGNESIUM 324 MG
TABLET ORAL
Qty: 0 | Refills: 0 | Status: DISCONTINUED | OUTPATIENT
Start: 2019-04-25 | End: 2019-04-25

## 2019-04-25 RX ORDER — SODIUM CHLORIDE 9 MG/ML
1000 INJECTION INTRAMUSCULAR; INTRAVENOUS; SUBCUTANEOUS ONCE
Qty: 0 | Refills: 0 | Status: COMPLETED | OUTPATIENT
Start: 2019-04-25 | End: 2019-04-25

## 2019-04-25 RX ORDER — ASPIRIN/CALCIUM CARB/MAGNESIUM 324 MG
81 TABLET ORAL DAILY
Qty: 0 | Refills: 0 | Status: DISCONTINUED | OUTPATIENT
Start: 2019-04-26 | End: 2019-05-01

## 2019-04-25 RX ORDER — ASPIRIN/CALCIUM CARB/MAGNESIUM 324 MG
TABLET ORAL
Qty: 0 | Refills: 0 | Status: DISCONTINUED | OUTPATIENT
Start: 2019-04-25 | End: 2019-05-01

## 2019-04-25 RX ADMIN — TRAMADOL HYDROCHLORIDE 50 MILLIGRAM(S): 50 TABLET ORAL at 23:47

## 2019-04-25 RX ADMIN — Medication 100 MILLIGRAM(S): at 13:29

## 2019-04-25 RX ADMIN — Medication 400 MILLIGRAM(S): at 05:31

## 2019-04-25 RX ADMIN — ONDANSETRON 4 MILLIGRAM(S): 8 TABLET, FILM COATED ORAL at 07:55

## 2019-04-25 RX ADMIN — ENOXAPARIN SODIUM 30 MILLIGRAM(S): 100 INJECTION SUBCUTANEOUS at 08:54

## 2019-04-25 RX ADMIN — CELECOXIB 200 MILLIGRAM(S): 200 CAPSULE ORAL at 21:53

## 2019-04-25 RX ADMIN — ATORVASTATIN CALCIUM 40 MILLIGRAM(S): 80 TABLET, FILM COATED ORAL at 21:18

## 2019-04-25 RX ADMIN — CELECOXIB 200 MILLIGRAM(S): 200 CAPSULE ORAL at 08:54

## 2019-04-25 RX ADMIN — PANTOPRAZOLE SODIUM 40 MILLIGRAM(S): 20 TABLET, DELAYED RELEASE ORAL at 05:30

## 2019-04-25 RX ADMIN — Medication 30 MILLILITER(S): at 17:16

## 2019-04-25 RX ADMIN — Medication 81 MILLIGRAM(S): at 14:30

## 2019-04-25 RX ADMIN — Medication 400 MILLIGRAM(S): at 00:33

## 2019-04-25 RX ADMIN — Medication 25 MILLIGRAM(S): at 11:45

## 2019-04-25 RX ADMIN — TRAMADOL HYDROCHLORIDE 50 MILLIGRAM(S): 50 TABLET ORAL at 23:17

## 2019-04-25 RX ADMIN — CELECOXIB 200 MILLIGRAM(S): 200 CAPSULE ORAL at 09:30

## 2019-04-25 RX ADMIN — Medication 1000 MILLIGRAM(S): at 21:53

## 2019-04-25 RX ADMIN — Medication 100 MILLIGRAM(S): at 05:30

## 2019-04-25 RX ADMIN — Medication 1000 MILLIGRAM(S): at 14:00

## 2019-04-25 RX ADMIN — SENNA PLUS 2 TABLET(S): 8.6 TABLET ORAL at 21:18

## 2019-04-25 RX ADMIN — SODIUM CHLORIDE 2000 MILLILITER(S): 9 INJECTION INTRAMUSCULAR; INTRAVENOUS; SUBCUTANEOUS at 11:46

## 2019-04-25 RX ADMIN — SODIUM CHLORIDE 125 MILLILITER(S): 9 INJECTION, SOLUTION INTRAVENOUS at 00:34

## 2019-04-25 RX ADMIN — Medication 1000 MILLIGRAM(S): at 13:29

## 2019-04-25 RX ADMIN — CELECOXIB 200 MILLIGRAM(S): 200 CAPSULE ORAL at 21:18

## 2019-04-25 RX ADMIN — ENOXAPARIN SODIUM 30 MILLIGRAM(S): 100 INJECTION SUBCUTANEOUS at 21:19

## 2019-04-25 RX ADMIN — Medication 100 MILLIGRAM(S): at 03:59

## 2019-04-25 RX ADMIN — Medication 100 MILLIGRAM(S): at 21:18

## 2019-04-25 RX ADMIN — Medication 1000 MILLIGRAM(S): at 21:18

## 2019-04-25 NOTE — PROGRESS NOTE ADULT - ASSESSMENT
68M with Aortic Aneurysm, GERD, OA and HLD admitted for staged procedure of bilateral Knees.     S/P TKR - Left POD 1  Continue Bowel regimen and pain control regimen. Incentive Spirometer for lung expansion. Work with PT to increase ambulation as per orthopedics. Orthopedics on board.  Monitor Hgb and follow up electrolytes. Zofran for nausea which most likely is from pain medications.     Elevated WBC  Most likely reactive. Afebrile.  Encourage Incentive Spirometer.  Hold on any additional workup for now    Aortic Aneurysm  Metoprolol to reduce shearing forces and control BP    HLD  Resume statin    GERD  Protonix    History of Prostate CA and Rectal Cancer  Observe    Diet  Regular    DVT Prophylaxis  Lovenox    Disposition  Full Code/Inpatient  Discharge planning pending hospital course

## 2019-04-25 NOTE — PROGRESS NOTE ADULT - SUBJECTIVE AND OBJECTIVE BOX
Subjective: No overnight events.  Some nausea being reported.     MEDICATIONS  (STANDING):  acetaminophen   Tablet .. 1000 milliGRAM(s) Oral every 8 hours  celecoxib 200 milliGRAM(s) Oral <User Schedule>  docusate sodium 100 milliGRAM(s) Oral three times a day  enoxaparin Injectable 30 milliGRAM(s) SubCutaneous every 12 hours  lactated ringers. 1000 milliLiter(s) (125 mL/Hr) IV Continuous <Continuous>  pantoprazole    Tablet 40 milliGRAM(s) Oral before breakfast  senna 2 Tablet(s) Oral at bedtime    MEDICATIONS  (PRN):  aluminum hydroxide/magnesium hydroxide/simethicone Suspension 30 milliLiter(s) Oral four times a day PRN Indigestion  HYDROmorphone  Injectable 0.5 milliGRAM(s) IV Push every 3 hours PRN Severe Pain (7 - 10)  magnesium hydroxide Suspension 30 milliLiter(s) Oral daily PRN Constipation  ondansetron Injectable 4 milliGRAM(s) IV Push every 6 hours PRN Nausea and/or Vomiting  oxyCODONE    IR 5 milliGRAM(s) Oral every 3 hours PRN Mild Pain (1 - 3)  oxyCODONE    IR 10 milliGRAM(s) Oral every 3 hours PRN Moderate Pain (4 - 6)  polyethylene glycol 3350 17 Gram(s) Oral daily PRN Constipation      Allergies    No Known Allergies    Intolerances        Vital Signs Last 24 Hrs  T(C): 36.5 (25 Apr 2019 07:57), Max: 36.9 (24 Apr 2019 14:26)  T(F): 97.7 (25 Apr 2019 07:57), Max: 98.5 (24 Apr 2019 19:38)  HR: 57 (25 Apr 2019 07:57) (56 - 92)  BP: 98/59 (25 Apr 2019 07:57) (97/60 - 134/73)  BP(mean): --  RR: 16 (25 Apr 2019 07:57) (10 - 22)  SpO2: 97% (25 Apr 2019 07:57) (90% - 99%)    PHYSICAL EXAM:  GENERAL: NAD, well-groomed, well-developed  HEAD:  Atraumatic, Normocephalic  ENMT: Moist mucous membranes,   NECK: Supple, No JVD, Normal thyroid  NERVOUS SYSTEM:  All 4 extremities mobile, no gross sensory deficits.   CHEST/LUNG: Clear to auscultation bilaterally; No rales, rhonchi, wheezing, or rubs  HEART: Regular rate and rhythm; No murmurs, rubs, or gallops  ABDOMEN: Soft, Nontender, Nondistended; Bowel sounds present  EXTREMITIES:  2+ Peripheral Pulses, No clubbing, cyanosis, or edema      LABS:                        12.5   16.50 )-----------( 153      ( 25 Apr 2019 07:46 )             37.1     25 Apr 2019 07:46    138    |  104    |  21     ----------------------------<  149    4.3     |  24     |  1.19     Ca    8.9        25 Apr 2019 07:46          CAPILLARY BLOOD GLUCOSE          RADIOLOGY & ADDITIONAL TESTS:    Imaging Personally Reviewed:  [ ] YES     Consultant(s) Notes Reviewed:      Care Discussed with Consultants/Other Providers:    Advanced Directives: [ ] DNR  [ ] No feeding tube  [ ] MOLST in chart  [ ] MOLST completed today  [ ] Unknown

## 2019-04-25 NOTE — PROGRESS NOTE ADULT - SUBJECTIVE AND OBJECTIVE BOX
Post Op     NEISHA PRETTY      68y        Male                                                                                                                 T(C): 36.1 (04-25-19 @ 03:30), Max: 37.3 (04-24-19 @ 10:41)  HR: 60 (04-25-19 @ 03:30) (56 - 92)  BP: 97/60 (04-25-19 @ 03:30) (97/60 - 134/73)  RR: 16 (04-25-19 @ 03:30) (10 - 22)  SpO2: 93% (04-25-19 @ 03:30) (90% - 100%)      S/P  left total knee replacement    Patient denies shortness of breath, chest pain, dyspnea, No complaints  Pain is 3/10    Physical Exam    Extremity: Bilaterally:  No holmon                                           No Cord                                          PAS on b/l                                           Neurovascular intact                                          Motor intact EHL/FHL                                          Sensation intact                                          Pulses intact DP/PT                                         Calves Soft                                         Dressing Clean / Dry / Intact hv x 1                                          Capillary refill with 5 seconds                          12.5   16.50 )-----------( 153      ( 25 Apr 2019 07:46 )             37.1       04-24    140  |  105  |  21  ----------------------------<  127<H>  3.8   |  22  |  1.18    Ca    8.6      24 Apr 2019 16:11        A/P  -- S/P left total knee replacement    -  Medicine To Follow   - DVT prophylaxis PAS b/l ,  lovenox for staged patient staged for monday  - PT & OT , celebrex  for pain   - Analgesia  - Incentive Spirometry  - Discharge Planning  - Safety Precautions  -  CBC , BMP daily   Patient with some nausea nurse notified , post op nausea meds ordered

## 2019-04-26 LAB
ANION GAP SERPL CALC-SCNC: 5 MMOL/L — SIGNIFICANT CHANGE UP (ref 5–17)
BUN SERPL-MCNC: 23 MG/DL — SIGNIFICANT CHANGE UP (ref 7–23)
CALCIUM SERPL-MCNC: 8.2 MG/DL — LOW (ref 8.4–10.5)
CHLORIDE SERPL-SCNC: 109 MMOL/L — HIGH (ref 96–108)
CO2 SERPL-SCNC: 27 MMOL/L — SIGNIFICANT CHANGE UP (ref 22–31)
CREAT SERPL-MCNC: 1.12 MG/DL — SIGNIFICANT CHANGE UP (ref 0.5–1.3)
GLUCOSE SERPL-MCNC: 94 MG/DL — SIGNIFICANT CHANGE UP (ref 70–99)
HCT VFR BLD CALC: 33.8 % — LOW (ref 39–50)
HGB BLD-MCNC: 11 G/DL — LOW (ref 13–17)
MCHC RBC-ENTMCNC: 28.8 PG — SIGNIFICANT CHANGE UP (ref 27–34)
MCHC RBC-ENTMCNC: 32.5 GM/DL — SIGNIFICANT CHANGE UP (ref 32–36)
MCV RBC AUTO: 88.5 FL — SIGNIFICANT CHANGE UP (ref 80–100)
NRBC # BLD: 0 /100 WBCS — SIGNIFICANT CHANGE UP (ref 0–0)
PLATELET # BLD AUTO: 130 K/UL — LOW (ref 150–400)
POTASSIUM SERPL-MCNC: 4.5 MMOL/L — SIGNIFICANT CHANGE UP (ref 3.5–5.3)
POTASSIUM SERPL-SCNC: 4.5 MMOL/L — SIGNIFICANT CHANGE UP (ref 3.5–5.3)
RBC # BLD: 3.82 M/UL — LOW (ref 4.2–5.8)
RBC # FLD: 13.4 % — SIGNIFICANT CHANGE UP (ref 10.3–14.5)
SODIUM SERPL-SCNC: 141 MMOL/L — SIGNIFICANT CHANGE UP (ref 135–145)
WBC # BLD: 9.65 K/UL — SIGNIFICANT CHANGE UP (ref 3.8–10.5)
WBC # FLD AUTO: 9.65 K/UL — SIGNIFICANT CHANGE UP (ref 3.8–10.5)

## 2019-04-26 PROCEDURE — 99233 SBSQ HOSP IP/OBS HIGH 50: CPT

## 2019-04-26 RX ORDER — TRAMADOL HYDROCHLORIDE 50 MG/1
50 TABLET ORAL EVERY 4 HOURS
Qty: 0 | Refills: 0 | Status: DISCONTINUED | OUTPATIENT
Start: 2019-04-26 | End: 2019-04-29

## 2019-04-26 RX ORDER — TRAMADOL HYDROCHLORIDE 50 MG/1
100 TABLET ORAL EVERY 6 HOURS
Qty: 0 | Refills: 0 | Status: DISCONTINUED | OUTPATIENT
Start: 2019-04-26 | End: 2019-04-29

## 2019-04-26 RX ADMIN — Medication 1000 MILLIGRAM(S): at 16:11

## 2019-04-26 RX ADMIN — TRAMADOL HYDROCHLORIDE 50 MILLIGRAM(S): 50 TABLET ORAL at 05:57

## 2019-04-26 RX ADMIN — Medication 1000 MILLIGRAM(S): at 21:29

## 2019-04-26 RX ADMIN — CELECOXIB 200 MILLIGRAM(S): 200 CAPSULE ORAL at 09:23

## 2019-04-26 RX ADMIN — ENOXAPARIN SODIUM 30 MILLIGRAM(S): 100 INJECTION SUBCUTANEOUS at 21:24

## 2019-04-26 RX ADMIN — TRAMADOL HYDROCHLORIDE 50 MILLIGRAM(S): 50 TABLET ORAL at 23:33

## 2019-04-26 RX ADMIN — TRAMADOL HYDROCHLORIDE 50 MILLIGRAM(S): 50 TABLET ORAL at 19:04

## 2019-04-26 RX ADMIN — TRAMADOL HYDROCHLORIDE 50 MILLIGRAM(S): 50 TABLET ORAL at 18:34

## 2019-04-26 RX ADMIN — ENOXAPARIN SODIUM 30 MILLIGRAM(S): 100 INJECTION SUBCUTANEOUS at 09:23

## 2019-04-26 RX ADMIN — Medication 100 MILLIGRAM(S): at 05:27

## 2019-04-26 RX ADMIN — Medication 1000 MILLIGRAM(S): at 21:23

## 2019-04-26 RX ADMIN — Medication 100 MILLIGRAM(S): at 16:12

## 2019-04-26 RX ADMIN — CELECOXIB 200 MILLIGRAM(S): 200 CAPSULE ORAL at 10:00

## 2019-04-26 RX ADMIN — Medication 1000 MILLIGRAM(S): at 05:27

## 2019-04-26 RX ADMIN — CELECOXIB 200 MILLIGRAM(S): 200 CAPSULE ORAL at 21:29

## 2019-04-26 RX ADMIN — SENNA PLUS 2 TABLET(S): 8.6 TABLET ORAL at 21:24

## 2019-04-26 RX ADMIN — TRAMADOL HYDROCHLORIDE 50 MILLIGRAM(S): 50 TABLET ORAL at 13:59

## 2019-04-26 RX ADMIN — Medication 1000 MILLIGRAM(S): at 06:32

## 2019-04-26 RX ADMIN — Medication 100 MILLIGRAM(S): at 21:24

## 2019-04-26 RX ADMIN — CELECOXIB 200 MILLIGRAM(S): 200 CAPSULE ORAL at 21:24

## 2019-04-26 RX ADMIN — TRAMADOL HYDROCHLORIDE 50 MILLIGRAM(S): 50 TABLET ORAL at 05:27

## 2019-04-26 RX ADMIN — Medication 81 MILLIGRAM(S): at 16:12

## 2019-04-26 RX ADMIN — PANTOPRAZOLE SODIUM 40 MILLIGRAM(S): 20 TABLET, DELAYED RELEASE ORAL at 05:27

## 2019-04-26 RX ADMIN — TRAMADOL HYDROCHLORIDE 50 MILLIGRAM(S): 50 TABLET ORAL at 09:47

## 2019-04-26 RX ADMIN — ATORVASTATIN CALCIUM 40 MILLIGRAM(S): 80 TABLET, FILM COATED ORAL at 21:24

## 2019-04-26 RX ADMIN — Medication 1000 MILLIGRAM(S): at 17:17

## 2019-04-26 RX ADMIN — TRAMADOL HYDROCHLORIDE 50 MILLIGRAM(S): 50 TABLET ORAL at 14:35

## 2019-04-26 RX ADMIN — TRAMADOL HYDROCHLORIDE 50 MILLIGRAM(S): 50 TABLET ORAL at 10:30

## 2019-04-26 NOTE — PROGRESS NOTE ADULT - ASSESSMENT
68M with Aortic Aneurysm, GERD, OA and HLD admitted for staged procedure of bilateral Knees.     S/P TKR - Left POD 2  Continue Bowel regimen and pain control regimen. Incentive Spirometer for lung expansion. Work with PT to increase ambulation as per orthopedics. Orthopedics on board.  Monitor Hgb and follow up electrolytes. Zofran for nausea which most likely is from pain medications.     Aortic Aneurysm  Metoprolol to reduce shearing forces and control BP    HLD  Resume statin    GERD  Protonix    History of Prostate CA and Rectal Cancer  Observe    Diet  Regular    DVT Prophylaxis  Lovenox    Disposition  Full Code/Inpatient  Discharge planning pending hospital course 68M with Aortic Aneurysm, GERD, OA and HLD admitted for staged procedure of bilateral Knees.     S/P TKR - Left POD 2  Continue Bowel regimen and pain control regimen. Incentive Spirometer for lung expansion. Work with PT to increase ambulation as per orthopedics. Orthopedics on board.  Monitor Hgb and follow up electrolytes. Zofran and Phergan for nausea which most likely is from pain medications.  Switched to Tramadol and will continue IV Hydration.     Aortic Aneurysm  Metoprolol to reduce shearing forces and control BP    HLD  Resume statin    GERD  Protonix    History of Prostate CA and Rectal Cancer  Observe    Diet  Regular    DVT Prophylaxis  Lovenox    Disposition  Full Code/Inpatient  Discharge planning pending hospital course

## 2019-04-26 NOTE — PROGRESS NOTE ADULT - SUBJECTIVE AND OBJECTIVE BOX
Post Op     NEISHA PRETTY      68y        Male                                                                                                                 T(C): 36.9 (04-26-19 @ 03:59), Max: 37.2 (04-25-19 @ 23:27)  HR: 62 (04-26-19 @ 03:59) (62 - 84)  BP: 107/67 (04-26-19 @ 03:59) (94/54 - 119/69)  RR: 14 (04-26-19 @ 03:59) (14 - 17)  SpO2: 100% (04-26-19 @ 03:59) (94% - 100%)      S/P   left tota knee replacement stage 1    Patient denies shortness of breath, chest pain, dyspnea, No complaints  Pain is 3 /10    Physical Exam    Extremity: Bilaterally:  No holmon                                           No Cord                                          PAS on b/l                                           Neurovascular intact                                          Motor intact EHL/FHL                                          Sensation intact                                          Pulses intact DP/PT                                         Calves Soft                                         Dressing Clean / Dry / Intact hv removed  incsion clean dry intact                          11.0   9.65  )-----------( 130      ( 26 Apr 2019 07:37 )             33.8       04-25    138  |  104  |  21  ----------------------------<  149<H>  4.3   |  24  |  1.19    Ca    8.9      25 Apr 2019 07:46        A/P  -- S/P total knee replacement stage 1     -  Medicine To Follow   - DVT prophylaxis PAS lovenox  patient  switched to tramadol for pain  2ndery to nauseau ?  - PT & OT   - Analagesia  - Incentive Spirometry  - Discharge Planning  - Safety Precautions  -  CBC , BMP daily    staged for monday

## 2019-04-26 NOTE — PROGRESS NOTE ADULT - SUBJECTIVE AND OBJECTIVE BOX
Subjective: No overnight events. Doing well. Nausea improved and now on Tramadol.     MEDICATIONS  (STANDING):  acetaminophen   Tablet .. 1000 milliGRAM(s) Oral every 8 hours  aspirin enteric coated      aspirin enteric coated 81 milliGRAM(s) Oral daily  atorvastatin 40 milliGRAM(s) Oral at bedtime  celecoxib 200 milliGRAM(s) Oral <User Schedule>  docusate sodium 100 milliGRAM(s) Oral three times a day  enoxaparin Injectable 30 milliGRAM(s) SubCutaneous every 12 hours  lactated ringers. 1000 milliLiter(s) (125 mL/Hr) IV Continuous <Continuous>  metoprolol succinate ER 25 milliGRAM(s) Oral daily  pantoprazole    Tablet 40 milliGRAM(s) Oral before breakfast  senna 2 Tablet(s) Oral at bedtime    MEDICATIONS  (PRN):  aluminum hydroxide/magnesium hydroxide/simethicone Suspension 30 milliLiter(s) Oral four times a day PRN Indigestion  bisacodyl Suppository 10 milliGRAM(s) Rectal daily PRN If no bowel movement by postoperative day #2  HYDROmorphone  Injectable 0.5 milliGRAM(s) IV Push every 3 hours PRN Severe Pain (7 - 10)  magnesium hydroxide Suspension 30 milliLiter(s) Oral daily PRN Constipation  ondansetron Injectable 4 milliGRAM(s) IV Push every 6 hours PRN Nausea and/or Vomiting  polyethylene glycol 3350 17 Gram(s) Oral daily PRN Constipation  promethazine 25 milliGRAM(s) Oral every 6 hours PRN nausea  traMADol 50 milliGRAM(s) Oral every 4 hours PRN Mild Pain (1 - 3)  traMADol 100 milliGRAM(s) Oral every 6 hours PRN Moderate Pain (4 - 6)      Allergies    No Known Allergies    Intolerances        Vital Signs Last 24 Hrs  T(C): 36.8 (26 Apr 2019 08:09), Max: 37.2 (25 Apr 2019 23:27)  T(F): 98.3 (26 Apr 2019 08:09), Max: 98.9 (25 Apr 2019 23:27)  HR: 70 (26 Apr 2019 08:09) (62 - 84)  BP: 111/67 (26 Apr 2019 08:09) (94/54 - 119/69)  BP(mean): --  RR: 17 (26 Apr 2019 08:09) (14 - 17)  SpO2: 96% (26 Apr 2019 08:09) (94% - 100%)    PHYSICAL EXAM:  GENERAL: NAD, well-groomed, well-developed  HEAD:  Atraumatic, Normocephalic  ENMT: Moist mucous membranes,   NECK: Supple, No JVD, Normal thyroid  NERVOUS SYSTEM:  All 4 extremities mobile, no gross sensory deficits.   CHEST/LUNG: Clear to auscultation bilaterally; No rales, rhonchi, wheezing, or rubs  HEART: Regular rate and rhythm; No murmurs, rubs, or gallops  ABDOMEN: Soft, Nontender, Nondistended; Bowel sounds present  EXTREMITIES:  2+ Peripheral Pulses, No clubbing, cyanosis, or edema      LABS:                        11.0   9.65  )-----------( 130      ( 26 Apr 2019 07:37 )             33.8     26 Apr 2019 07:37    141    |  109    |  23     ----------------------------<  94     4.5     |  27     |  1.12     Ca    8.2        26 Apr 2019 07:37          CAPILLARY BLOOD GLUCOSE          RADIOLOGY & ADDITIONAL TESTS:    Imaging Personally Reviewed:  [ ] YES     Consultant(s) Notes Reviewed:      Care Discussed with Consultants/Other Providers:    Advanced Directives: [ ] DNR  [ ] No feeding tube  [ ] MOLST in chart  [ ] MOLST completed today  [ ] Unknown

## 2019-04-27 LAB
ANION GAP SERPL CALC-SCNC: 7 MMOL/L — SIGNIFICANT CHANGE UP (ref 5–17)
BUN SERPL-MCNC: 15 MG/DL — SIGNIFICANT CHANGE UP (ref 7–23)
CALCIUM SERPL-MCNC: 8.8 MG/DL — SIGNIFICANT CHANGE UP (ref 8.4–10.5)
CHLORIDE SERPL-SCNC: 104 MMOL/L — SIGNIFICANT CHANGE UP (ref 96–108)
CO2 SERPL-SCNC: 29 MMOL/L — SIGNIFICANT CHANGE UP (ref 22–31)
CREAT SERPL-MCNC: 1.1 MG/DL — SIGNIFICANT CHANGE UP (ref 0.5–1.3)
GLUCOSE SERPL-MCNC: 95 MG/DL — SIGNIFICANT CHANGE UP (ref 70–99)
HCT VFR BLD CALC: 35.5 % — LOW (ref 39–50)
HGB BLD-MCNC: 11.6 G/DL — LOW (ref 13–17)
MCHC RBC-ENTMCNC: 28.9 PG — SIGNIFICANT CHANGE UP (ref 27–34)
MCHC RBC-ENTMCNC: 32.7 GM/DL — SIGNIFICANT CHANGE UP (ref 32–36)
MCV RBC AUTO: 88.3 FL — SIGNIFICANT CHANGE UP (ref 80–100)
NRBC # BLD: 0 /100 WBCS — SIGNIFICANT CHANGE UP (ref 0–0)
PLATELET # BLD AUTO: 144 K/UL — LOW (ref 150–400)
POTASSIUM SERPL-MCNC: 4.3 MMOL/L — SIGNIFICANT CHANGE UP (ref 3.5–5.3)
POTASSIUM SERPL-SCNC: 4.3 MMOL/L — SIGNIFICANT CHANGE UP (ref 3.5–5.3)
RBC # BLD: 4.02 M/UL — LOW (ref 4.2–5.8)
RBC # FLD: 13.5 % — SIGNIFICANT CHANGE UP (ref 10.3–14.5)
SODIUM SERPL-SCNC: 140 MMOL/L — SIGNIFICANT CHANGE UP (ref 135–145)
WBC # BLD: 8.24 K/UL — SIGNIFICANT CHANGE UP (ref 3.8–10.5)
WBC # FLD AUTO: 8.24 K/UL — SIGNIFICANT CHANGE UP (ref 3.8–10.5)

## 2019-04-27 PROCEDURE — 99233 SBSQ HOSP IP/OBS HIGH 50: CPT

## 2019-04-27 PROCEDURE — 93970 EXTREMITY STUDY: CPT | Mod: 26

## 2019-04-27 RX ADMIN — TRAMADOL HYDROCHLORIDE 50 MILLIGRAM(S): 50 TABLET ORAL at 05:12

## 2019-04-27 RX ADMIN — ONDANSETRON 4 MILLIGRAM(S): 8 TABLET, FILM COATED ORAL at 08:21

## 2019-04-27 RX ADMIN — TRAMADOL HYDROCHLORIDE 50 MILLIGRAM(S): 50 TABLET ORAL at 05:42

## 2019-04-27 RX ADMIN — Medication 100 MILLIGRAM(S): at 14:50

## 2019-04-27 RX ADMIN — Medication 25 MILLIGRAM(S): at 11:34

## 2019-04-27 RX ADMIN — TRAMADOL HYDROCHLORIDE 50 MILLIGRAM(S): 50 TABLET ORAL at 00:03

## 2019-04-27 RX ADMIN — MAGNESIUM HYDROXIDE 30 MILLILITER(S): 400 TABLET, CHEWABLE ORAL at 11:31

## 2019-04-27 RX ADMIN — PANTOPRAZOLE SODIUM 40 MILLIGRAM(S): 20 TABLET, DELAYED RELEASE ORAL at 05:12

## 2019-04-27 RX ADMIN — ENOXAPARIN SODIUM 30 MILLIGRAM(S): 100 INJECTION SUBCUTANEOUS at 09:34

## 2019-04-27 RX ADMIN — Medication 1000 MILLIGRAM(S): at 14:50

## 2019-04-27 RX ADMIN — CELECOXIB 200 MILLIGRAM(S): 200 CAPSULE ORAL at 09:33

## 2019-04-27 RX ADMIN — Medication 81 MILLIGRAM(S): at 11:34

## 2019-04-27 RX ADMIN — CELECOXIB 200 MILLIGRAM(S): 200 CAPSULE ORAL at 09:34

## 2019-04-27 RX ADMIN — Medication 1000 MILLIGRAM(S): at 21:44

## 2019-04-27 RX ADMIN — Medication 1000 MILLIGRAM(S): at 05:12

## 2019-04-27 RX ADMIN — Medication 1000 MILLIGRAM(S): at 05:40

## 2019-04-27 RX ADMIN — ENOXAPARIN SODIUM 30 MILLIGRAM(S): 100 INJECTION SUBCUTANEOUS at 21:44

## 2019-04-27 RX ADMIN — Medication 1000 MILLIGRAM(S): at 15:11

## 2019-04-27 RX ADMIN — Medication 100 MILLIGRAM(S): at 05:12

## 2019-04-27 RX ADMIN — CELECOXIB 200 MILLIGRAM(S): 200 CAPSULE ORAL at 21:44

## 2019-04-27 RX ADMIN — Medication 10 MILLIGRAM(S): at 17:08

## 2019-04-27 NOTE — PROGRESS NOTE ADULT - ASSESSMENT
68M with Aortic Aneurysm, GERD, OA and HLD admitted for staged procedure of bilateral Knees.     S/P TKR   Continue Bowel regimen and pain control regimen. Incentive Spirometer for lung expansion. Work with PT to increase ambulation as per orthopedics. Orthopedics on board.  Monitor Hgb and follow up electrolytes. Zofran and Phergan for nausea which most likely is from pain medications.  Switched to Tramadol and will continue IV Hydration.     Aortic Aneurysm  Metoprolol to reduce shearing forces and control BP    HLD  Resume statin    GERD  Protonix    History of Prostate CA and Rectal Cancer  Observe    Diet  Regular    DVT Prophylaxis  Lovenox    Disposition  Full Code/Inpatient  Discharge planning to [facility] after staged surgery.

## 2019-04-27 NOTE — PROGRESS NOTE ADULT - SUBJECTIVE AND OBJECTIVE BOX
Patient is a 68y old  Male who presents with a chief complaint of Staged Knee Procedure (26 Apr 2019 11:13)      INTERVAL HPI/OVERNIGHT EVENTS:  Pt is seen and examined.  pain is controlled.    Pain Location & Control:     MEDICATIONS  (STANDING):  acetaminophen   Tablet .. 1000 milliGRAM(s) Oral every 8 hours  aspirin enteric coated      aspirin enteric coated 81 milliGRAM(s) Oral daily  atorvastatin 40 milliGRAM(s) Oral at bedtime  celecoxib 200 milliGRAM(s) Oral <User Schedule>  docusate sodium 100 milliGRAM(s) Oral three times a day  enoxaparin Injectable 30 milliGRAM(s) SubCutaneous every 12 hours  lactated ringers. 1000 milliLiter(s) (125 mL/Hr) IV Continuous <Continuous>  metoprolol succinate ER 25 milliGRAM(s) Oral daily  pantoprazole    Tablet 40 milliGRAM(s) Oral before breakfast  senna 2 Tablet(s) Oral at bedtime    MEDICATIONS  (PRN):  aluminum hydroxide/magnesium hydroxide/simethicone Suspension 30 milliLiter(s) Oral four times a day PRN Indigestion  bisacodyl Suppository 10 milliGRAM(s) Rectal daily PRN If no bowel movement by postoperative day #2  HYDROmorphone  Injectable 0.5 milliGRAM(s) IV Push every 3 hours PRN Severe Pain (7 - 10)  magnesium hydroxide Suspension 30 milliLiter(s) Oral daily PRN Constipation  ondansetron Injectable 4 milliGRAM(s) IV Push every 6 hours PRN Nausea and/or Vomiting  polyethylene glycol 3350 17 Gram(s) Oral daily PRN Constipation  promethazine 25 milliGRAM(s) Oral every 6 hours PRN nausea  traMADol 50 milliGRAM(s) Oral every 4 hours PRN Mild Pain (1 - 3)  traMADol 100 milliGRAM(s) Oral every 6 hours PRN Moderate Pain (4 - 6)      Allergies    No Known Allergies    Intolerances      Vital Signs Last 24 Hrs  T(C): 36.6 (27 Apr 2019 07:16), Max: 37.1 (26 Apr 2019 23:17)  T(F): 97.8 (27 Apr 2019 07:16), Max: 98.7 (26 Apr 2019 23:17)  HR: 75 (27 Apr 2019 07:16) (56 - 75)  BP: 102/65 (27 Apr 2019 07:16) (97/61 - 108/70)  BP(mean): --  RR: 17 (26 Apr 2019 23:17) (16 - 17)  SpO2: 95% (27 Apr 2019 07:16) (94% - 97%)        LABS:                        11.6   8.24  )-----------( 144      ( 27 Apr 2019 07:16 )             35.5     27 Apr 2019 07:16    140    |  104    |  15     ----------------------------<  95     4.3     |  29     |  1.10     Ca    8.8        27 Apr 2019 07:16        RADIOLOGY & ADDITIONAL TESTS:    Imaging Personally Reviewed:  [ ] YES  [ ] NO    Consultant(s) Notes Reviewed:  [ ] YES  [ ] NO    Care Discussed with Consultants/Other Providers [x ] YES  [ ] NO

## 2019-04-27 NOTE — PROGRESS NOTE ADULT - SUBJECTIVE AND OBJECTIVE BOX
POST OPERATIVE DAY #: 3    68y Male  s/p  Left   TKA                       SUBJECTIVE: Patient seen and examined at bedside. c/o nausea, vomiting x1    Pain:  well controlled  Pain scale:  3 /10  Denies CP, SOB, N/V/D, weakness, numbness   No new complains     OBJECTIVE:     Vital Signs Last 24 Hrs  T(C): 36.6 (27 Apr 2019 07:16), Max: 37.1 (26 Apr 2019 23:17)  T(F): 97.8 (27 Apr 2019 07:16), Max: 98.7 (26 Apr 2019 23:17)  HR: 75 (27 Apr 2019 07:16) (56 - 75)  BP: 102/65 (27 Apr 2019 07:16) (97/61 - 108/70)  BP(mean): --  RR: 17 (26 Apr 2019 23:17) (16 - 17)  SpO2: 95% (27 Apr 2019 07:16) (94% - 97%)    Affected extremity: LLE         Dressing: changed incision I/C/D                 Sensation: intact to light touch          Motor exam:   5/ 5 Tibialis Anterior/Gastrocnemius-Soleus, EHL/FHL         warm, well-perfused; capillary refill < 3 seconds         negative calf tenderness B/L LE    LABS:                        11.6   8.24  )-----------( 144      ( 27 Apr 2019 07:16 )             35.5     04-27    140  |  104  |  15  ----------------------------<  95  4.3   |  29  |  1.10    Ca    8.8      27 Apr 2019 07:16          MEDICATIONS:    Pain management:  acetaminophen   Tablet .. 1000 milliGRAM(s) Oral every 8 hours  celecoxib 200 milliGRAM(s) Oral <User Schedule>  HYDROmorphone  Injectable 0.5 milliGRAM(s) IV Push every 3 hours PRN  ondansetron Injectable 4 milliGRAM(s) IV Push every 6 hours PRN  promethazine 25 milliGRAM(s) Oral every 6 hours PRN  traMADol 50 milliGRAM(s) Oral every 4 hours PRN  traMADol 100 milliGRAM(s) Oral every 6 hours PRN    DVT prophylaxis:   aspirin enteric coated      aspirin enteric coated 81 milliGRAM(s) Oral daily  enoxaparin Injectable 30 milliGRAM(s) SubCutaneous every 12 hours      RADIOLOGY & ADDITIONAL STUDIES:    ASSESSMENT AND PLAN:   - doppler negative   - Analgesic pain control  - DVT prophylaxis:   Lovenox 30mg twice a day  SCDs       - Pain Management: Celebrex 200mg twice a day x 21 days   - PT/OT: Weight Bearing Status:  Weight bearing as tolerated, OOBTC       -  Incentive spirometry  - Advance diet as tolerated  - Hospitalist is following  -  Follow up labs  - pending preop clearance   -  Disposition: staged

## 2019-04-28 LAB
ANION GAP SERPL CALC-SCNC: 8 MMOL/L — SIGNIFICANT CHANGE UP (ref 5–17)
APTT BLD: 36 SEC — SIGNIFICANT CHANGE UP (ref 28.5–37)
BUN SERPL-MCNC: 19 MG/DL — SIGNIFICANT CHANGE UP (ref 7–23)
CALCIUM SERPL-MCNC: 9 MG/DL — SIGNIFICANT CHANGE UP (ref 8.4–10.5)
CHLORIDE SERPL-SCNC: 103 MMOL/L — SIGNIFICANT CHANGE UP (ref 96–108)
CO2 SERPL-SCNC: 29 MMOL/L — SIGNIFICANT CHANGE UP (ref 22–31)
CREAT SERPL-MCNC: 1.23 MG/DL — SIGNIFICANT CHANGE UP (ref 0.5–1.3)
GLUCOSE SERPL-MCNC: 93 MG/DL — SIGNIFICANT CHANGE UP (ref 70–99)
HCT VFR BLD CALC: 37.4 % — LOW (ref 39–50)
HGB BLD-MCNC: 12.1 G/DL — LOW (ref 13–17)
INR BLD: 1.09 RATIO — SIGNIFICANT CHANGE UP (ref 0.88–1.16)
MCHC RBC-ENTMCNC: 28.7 PG — SIGNIFICANT CHANGE UP (ref 27–34)
MCHC RBC-ENTMCNC: 32.4 GM/DL — SIGNIFICANT CHANGE UP (ref 32–36)
MCV RBC AUTO: 88.8 FL — SIGNIFICANT CHANGE UP (ref 80–100)
NRBC # BLD: 0 /100 WBCS — SIGNIFICANT CHANGE UP (ref 0–0)
PLATELET # BLD AUTO: 143 K/UL — LOW (ref 150–400)
POTASSIUM SERPL-MCNC: 4.4 MMOL/L — SIGNIFICANT CHANGE UP (ref 3.5–5.3)
POTASSIUM SERPL-SCNC: 4.4 MMOL/L — SIGNIFICANT CHANGE UP (ref 3.5–5.3)
PROTHROM AB SERPL-ACNC: 11.9 SEC — SIGNIFICANT CHANGE UP (ref 10–12.9)
RBC # BLD: 4.21 M/UL — SIGNIFICANT CHANGE UP (ref 4.2–5.8)
RBC # FLD: 13.4 % — SIGNIFICANT CHANGE UP (ref 10.3–14.5)
SODIUM SERPL-SCNC: 140 MMOL/L — SIGNIFICANT CHANGE UP (ref 135–145)
WBC # BLD: 7.65 K/UL — SIGNIFICANT CHANGE UP (ref 3.8–10.5)
WBC # FLD AUTO: 7.65 K/UL — SIGNIFICANT CHANGE UP (ref 3.8–10.5)

## 2019-04-28 PROCEDURE — 99233 SBSQ HOSP IP/OBS HIGH 50: CPT

## 2019-04-28 RX ORDER — VANCOMYCIN HCL 1 G
1500 VIAL (EA) INTRAVENOUS ONCE
Qty: 0 | Refills: 0 | Status: COMPLETED | OUTPATIENT
Start: 2019-04-29 | End: 2019-04-29

## 2019-04-28 RX ORDER — SODIUM CHLORIDE 9 MG/ML
1000 INJECTION, SOLUTION INTRAVENOUS
Qty: 0 | Refills: 0 | Status: DISCONTINUED | OUTPATIENT
Start: 2019-04-28 | End: 2019-05-01

## 2019-04-28 RX ADMIN — Medication 100 MILLIGRAM(S): at 05:17

## 2019-04-28 RX ADMIN — Medication 81 MILLIGRAM(S): at 12:42

## 2019-04-28 RX ADMIN — Medication 25 MILLIGRAM(S): at 09:21

## 2019-04-28 RX ADMIN — ENOXAPARIN SODIUM 30 MILLIGRAM(S): 100 INJECTION SUBCUTANEOUS at 09:21

## 2019-04-28 RX ADMIN — CELECOXIB 200 MILLIGRAM(S): 200 CAPSULE ORAL at 09:50

## 2019-04-28 RX ADMIN — Medication 1000 MILLIGRAM(S): at 21:22

## 2019-04-28 RX ADMIN — Medication 1000 MILLIGRAM(S): at 14:07

## 2019-04-28 RX ADMIN — PANTOPRAZOLE SODIUM 40 MILLIGRAM(S): 20 TABLET, DELAYED RELEASE ORAL at 05:17

## 2019-04-28 RX ADMIN — Medication 100 MILLIGRAM(S): at 14:07

## 2019-04-28 RX ADMIN — CELECOXIB 200 MILLIGRAM(S): 200 CAPSULE ORAL at 09:20

## 2019-04-28 RX ADMIN — Medication 1000 MILLIGRAM(S): at 05:17

## 2019-04-28 RX ADMIN — Medication 1000 MILLIGRAM(S): at 21:21

## 2019-04-28 NOTE — PROGRESS NOTE ADULT - SUBJECTIVE AND OBJECTIVE BOX
ORTHOPEDIC PA PROGRESS NOTE  NEISHA PRETTY      68y Male                                                                                                                               POD #    4d STAGED    STATUS POST:       Procedure: Left total knee arthroplasty           No complaints.    NAD pt resting comfortable  Current Pain Management:  PRN    T(F): 98  HR: 71  BP: 105/71  RR: 19  SpO2: 96%                         12.1   7.65  )-----------( 143      ( 28 Apr 2019 06:40 )             37.4         04-28    140  |  103  |  19  ----------------------------<  93  4.4   |  29  |  1.23    Ca    9.0      28 Apr 2019 06:40    PT/INR - ( 28 Apr 2019 06:40 )   PT: 11.9 sec;   INR: 1.09 ratio           Physical Exam :    -   Dressing changed sterile.   -   Wound C/D/I.   -   Distal Neurvascular status intact grossly.   -   Warm well perfused; capillary refill <3 seconds   -   (+)EHL/FHL   -   (+) Sensation to light touch  -   (-) Calf tenderness Bilaterally    A/P: 68y Male s/p Left total knee arthroplasty     -   Ortho Stable  -   Pain control:  PRN  -   Medicine to follow  -   DVT ppx:    PAS +  enoxaparin Injectable: 30 milliGRAM(s) SubCutaneous, aspirin enteric coated: 81 milliGRAM(s) Oral, enoxaparin Injectable: 30 milliGRAM(s) SubCutaneous  -   Weight bearing status: WBAT  -   Pre op for staged procedure

## 2019-04-28 NOTE — PROGRESS NOTE ADULT - SUBJECTIVE AND OBJECTIVE BOX
Patient is a 68y old  Male who presents with a chief complaint of Staged Knee Procedure (26 Apr 2019 11:13)      Subjective: feels better  no new issues    MEDICATIONS  (STANDING):  acetaminophen   Tablet .. 1000 milliGRAM(s) Oral every 8 hours  aspirin enteric coated      aspirin enteric coated 81 milliGRAM(s) Oral daily  docusate sodium 100 milliGRAM(s) Oral three times a day  lactated ringers. 1000 milliLiter(s) (125 mL/Hr) IV Continuous <Continuous>  lactated ringers. 1000 milliLiter(s) (75 mL/Hr) IV Continuous <Continuous>  metoprolol succinate ER 25 milliGRAM(s) Oral daily  pantoprazole    Tablet 40 milliGRAM(s) Oral before breakfast  senna 2 Tablet(s) Oral at bedtime    MEDICATIONS  (PRN):  aluminum hydroxide/magnesium hydroxide/simethicone Suspension 30 milliLiter(s) Oral four times a day PRN Indigestion  bisacodyl Suppository 10 milliGRAM(s) Rectal daily PRN If no bowel movement by postoperative day #2  HYDROmorphone  Injectable 0.5 milliGRAM(s) IV Push every 3 hours PRN Severe Pain (7 - 10)  magnesium hydroxide Suspension 30 milliLiter(s) Oral daily PRN Constipation  ondansetron Injectable 4 milliGRAM(s) IV Push every 6 hours PRN Nausea and/or Vomiting  polyethylene glycol 3350 17 Gram(s) Oral daily PRN Constipation  promethazine 25 milliGRAM(s) Oral every 6 hours PRN nausea  traMADol 50 milliGRAM(s) Oral every 4 hours PRN Mild Pain (1 - 3)  traMADol 100 milliGRAM(s) Oral every 6 hours PRN Moderate Pain (4 - 6)      Allergies    No Known Allergies    Intolerances        REVIEW OF SYSTEMS:  negative unless otherwise specified above.    Vital Signs Last 24 Hrs  T(C): 36.7 (28 Apr 2019 07:46), Max: 37.1 (27 Apr 2019 15:00)  T(F): 98 (28 Apr 2019 07:46), Max: 98.7 (27 Apr 2019 15:00)  HR: 71 (28 Apr 2019 07:46) (67 - 72)  BP: 105/71 (28 Apr 2019 07:46) (105/71 - 130/77)  BP(mean): --  RR: 19 (28 Apr 2019 07:46) (16 - 19)  SpO2: 96% (28 Apr 2019 07:46) (96% - 98%)    PHYSICAL EXAM:  GENERAL: No apparent distress  HEAD:  Atraumatic, Normocephalic  EYES: conjunctiva and sclera clear  ENMT: Moist mucous membranes  NECK: Supple  CHEST/LUNG: Clear to auscultation bilaterally  HEART: Regular rate and rhythm  ABDOMEN: Soft, Nontender, Nondistended; Bowel sounds present  EXTREMITIES:  No clubbing, cyanosis or edema  SKIN: No rashes or lesions  NERVOUS SYSTEM:  Alert & Oriented X3; Bilateral Lower extremity mobile, sensation to light touch intact  INCISION: incision dry and intact        LABS:   Hematocrit: 37.4 % <L> (04-28 @ 06:40)  RBC Count: 4.21 M/uL (04-28 @ 06:40)  Hemoglobin: 12.1 g/dL <L> (04-28 @ 06:40)  Platelet Count - Automated: 143 K/uL <L> (04-28 @ 06:40)  WBC Count: 7.65 K/uL (04-28 @ 06:40)      04-28    140  |  103  |  19  ----------------------------<  93  4.4   |  29  |  1.23    Ca    9.0      28 Apr 2019 06:40                  PT/INR - ( 28 Apr 2019 06:40 )   PT: 11.9 sec;   INR: 1.09 ratio         PTT - ( 28 Apr 2019 06:40 )  PTT:36.0 sec                      IMAGING: images reviewed personally      Consultant Notes Reviewed and Care Discussed with relevant Consultants/Other Providers.

## 2019-04-28 NOTE — PROGRESS NOTE ADULT - ASSESSMENT
68M with Aortic Aneurysm, GERD, OA and HLD admitted for staged procedure of bilateral Knees.     S/P TKR   Continue Bowel regimen and pain control regimen.   tramadol prn  PT/OT per protocol    Pre-op eval: moderate risk for hollie-op complications, however, no resting or exertional cardiopulmonary symptoms. may proceed to OR without further cardiac testing.    Aortic Aneurysm  Metoprolol to reduce shearing forces and control BP    HLD  Resume statin    GERD  Protonix    History of Prostate CA and Rectal Cancer  Observe    Diet  Regular    DVT Prophylaxis  Lovenox    Disposition  Full Code/Inpatient  Discharge planning to [facility] [place] after staged surgery.

## 2019-04-29 ENCOUNTER — APPOINTMENT (OUTPATIENT)
Dept: ORTHOPEDIC SURGERY | Facility: HOSPITAL | Age: 69
End: 2019-04-29

## 2019-04-29 ENCOUNTER — RESULT REVIEW (OUTPATIENT)
Age: 69
End: 2019-04-29

## 2019-04-29 DIAGNOSIS — M17.11 UNILATERAL PRIMARY OSTEOARTHRITIS, RIGHT KNEE: ICD-10-CM

## 2019-04-29 LAB
ANION GAP SERPL CALC-SCNC: 7 MMOL/L — SIGNIFICANT CHANGE UP (ref 5–17)
BUN SERPL-MCNC: 17 MG/DL — SIGNIFICANT CHANGE UP (ref 7–23)
CALCIUM SERPL-MCNC: 8.6 MG/DL — SIGNIFICANT CHANGE UP (ref 8.4–10.5)
CHLORIDE SERPL-SCNC: 105 MMOL/L — SIGNIFICANT CHANGE UP (ref 96–108)
CO2 SERPL-SCNC: 28 MMOL/L — SIGNIFICANT CHANGE UP (ref 22–31)
CREAT SERPL-MCNC: 1.17 MG/DL — SIGNIFICANT CHANGE UP (ref 0.5–1.3)
GLUCOSE SERPL-MCNC: 135 MG/DL — HIGH (ref 70–99)
POTASSIUM SERPL-MCNC: 4.2 MMOL/L — SIGNIFICANT CHANGE UP (ref 3.5–5.3)
POTASSIUM SERPL-SCNC: 4.2 MMOL/L — SIGNIFICANT CHANGE UP (ref 3.5–5.3)
SODIUM SERPL-SCNC: 140 MMOL/L — SIGNIFICANT CHANGE UP (ref 135–145)

## 2019-04-29 PROCEDURE — 27447 TOTAL KNEE ARTHROPLASTY: CPT | Mod: 58,RT

## 2019-04-29 PROCEDURE — 88311 DECALCIFY TISSUE: CPT | Mod: 26

## 2019-04-29 PROCEDURE — 99233 SBSQ HOSP IP/OBS HIGH 50: CPT

## 2019-04-29 PROCEDURE — 73562 X-RAY EXAM OF KNEE 3: CPT | Mod: 26,RT

## 2019-04-29 PROCEDURE — 88305 TISSUE EXAM BY PATHOLOGIST: CPT | Mod: 26

## 2019-04-29 PROCEDURE — 20985 CPTR-ASST DIR MS PX: CPT | Mod: 58,RT

## 2019-04-29 RX ORDER — METOPROLOL TARTRATE 50 MG
25 TABLET ORAL DAILY
Qty: 0 | Refills: 0 | Status: DISCONTINUED | OUTPATIENT
Start: 2019-04-29 | End: 2019-05-01

## 2019-04-29 RX ORDER — ACETAMINOPHEN 500 MG
1000 TABLET ORAL EVERY 6 HOURS
Qty: 0 | Refills: 0 | Status: COMPLETED | OUTPATIENT
Start: 2019-04-29 | End: 2019-04-30

## 2019-04-29 RX ORDER — TRAMADOL HYDROCHLORIDE 50 MG/1
100 TABLET ORAL EVERY 6 HOURS
Qty: 0 | Refills: 0 | Status: DISCONTINUED | OUTPATIENT
Start: 2019-04-29 | End: 2019-05-01

## 2019-04-29 RX ORDER — MAGNESIUM HYDROXIDE 400 MG/1
30 TABLET, CHEWABLE ORAL DAILY
Qty: 0 | Refills: 0 | Status: DISCONTINUED | OUTPATIENT
Start: 2019-04-29 | End: 2019-05-01

## 2019-04-29 RX ORDER — ONDANSETRON 8 MG/1
4 TABLET, FILM COATED ORAL EVERY 6 HOURS
Qty: 0 | Refills: 0 | Status: DISCONTINUED | OUTPATIENT
Start: 2019-04-29 | End: 2019-05-01

## 2019-04-29 RX ORDER — DOCUSATE SODIUM 100 MG
100 CAPSULE ORAL THREE TIMES A DAY
Qty: 0 | Refills: 0 | Status: DISCONTINUED | OUTPATIENT
Start: 2019-04-29 | End: 2019-05-01

## 2019-04-29 RX ORDER — HYDROMORPHONE HYDROCHLORIDE 2 MG/ML
4 INJECTION INTRAMUSCULAR; INTRAVENOUS; SUBCUTANEOUS
Qty: 0 | Refills: 0 | Status: DISCONTINUED | OUTPATIENT
Start: 2019-04-29 | End: 2019-04-29

## 2019-04-29 RX ORDER — HYDROMORPHONE HYDROCHLORIDE 2 MG/ML
0.5 INJECTION INTRAMUSCULAR; INTRAVENOUS; SUBCUTANEOUS
Qty: 0 | Refills: 0 | Status: DISCONTINUED | OUTPATIENT
Start: 2019-04-29 | End: 2019-04-29

## 2019-04-29 RX ORDER — APIXABAN 2.5 MG/1
2.5 TABLET, FILM COATED ORAL EVERY 12 HOURS
Qty: 0 | Refills: 0 | Status: DISCONTINUED | OUTPATIENT
Start: 2019-05-01 | End: 2019-05-01

## 2019-04-29 RX ORDER — ROSUVASTATIN CALCIUM 5 MG/1
10 TABLET ORAL AT BEDTIME
Qty: 0 | Refills: 0 | Status: DISCONTINUED | OUTPATIENT
Start: 2019-04-29 | End: 2019-05-01

## 2019-04-29 RX ORDER — SODIUM CHLORIDE 9 MG/ML
1000 INJECTION, SOLUTION INTRAVENOUS
Qty: 0 | Refills: 0 | Status: DISCONTINUED | OUTPATIENT
Start: 2019-04-29 | End: 2019-05-01

## 2019-04-29 RX ORDER — TRAMADOL HYDROCHLORIDE 50 MG/1
50 TABLET ORAL EVERY 6 HOURS
Qty: 0 | Refills: 0 | Status: DISCONTINUED | OUTPATIENT
Start: 2019-04-29 | End: 2019-05-01

## 2019-04-29 RX ORDER — HYDROMORPHONE HYDROCHLORIDE 2 MG/ML
2 INJECTION INTRAMUSCULAR; INTRAVENOUS; SUBCUTANEOUS
Qty: 0 | Refills: 0 | Status: DISCONTINUED | OUTPATIENT
Start: 2019-04-29 | End: 2019-04-29

## 2019-04-29 RX ORDER — POLYETHYLENE GLYCOL 3350 17 G/17G
17 POWDER, FOR SOLUTION ORAL DAILY
Qty: 0 | Refills: 0 | Status: DISCONTINUED | OUTPATIENT
Start: 2019-04-29 | End: 2019-05-01

## 2019-04-29 RX ORDER — ACETAMINOPHEN 500 MG
650 TABLET ORAL EVERY 6 HOURS
Qty: 0 | Refills: 0 | Status: DISCONTINUED | OUTPATIENT
Start: 2019-04-29 | End: 2019-04-29

## 2019-04-29 RX ORDER — SENNA PLUS 8.6 MG/1
2 TABLET ORAL AT BEDTIME
Qty: 0 | Refills: 0 | Status: DISCONTINUED | OUTPATIENT
Start: 2019-04-29 | End: 2019-05-01

## 2019-04-29 RX ORDER — ATORVASTATIN CALCIUM 80 MG/1
40 TABLET, FILM COATED ORAL AT BEDTIME
Qty: 0 | Refills: 0 | Status: DISCONTINUED | OUTPATIENT
Start: 2019-04-29 | End: 2019-04-29

## 2019-04-29 RX ORDER — SODIUM CHLORIDE 9 MG/ML
1000 INJECTION, SOLUTION INTRAVENOUS
Qty: 0 | Refills: 0 | Status: DISCONTINUED | OUTPATIENT
Start: 2019-04-29 | End: 2019-04-29

## 2019-04-29 RX ORDER — ONDANSETRON 8 MG/1
4 TABLET, FILM COATED ORAL ONCE
Qty: 0 | Refills: 0 | Status: DISCONTINUED | OUTPATIENT
Start: 2019-04-29 | End: 2019-04-29

## 2019-04-29 RX ORDER — PANTOPRAZOLE SODIUM 20 MG/1
40 TABLET, DELAYED RELEASE ORAL
Qty: 0 | Refills: 0 | Status: DISCONTINUED | OUTPATIENT
Start: 2019-04-29 | End: 2019-05-01

## 2019-04-29 RX ORDER — ACETAMINOPHEN 500 MG
1000 TABLET ORAL EVERY 8 HOURS
Qty: 0 | Refills: 0 | Status: DISCONTINUED | OUTPATIENT
Start: 2019-04-30 | End: 2019-05-01

## 2019-04-29 RX ORDER — TRANEXAMIC ACID 100 MG/ML
1000 INJECTION, SOLUTION INTRAVENOUS ONCE
Qty: 0 | Refills: 0 | Status: COMPLETED | OUTPATIENT
Start: 2019-04-29 | End: 2019-04-29

## 2019-04-29 RX ORDER — APREPITANT 80 MG/1
40 CAPSULE ORAL ONCE
Qty: 0 | Refills: 0 | Status: COMPLETED | OUTPATIENT
Start: 2019-04-29 | End: 2019-04-29

## 2019-04-29 RX ORDER — VANCOMYCIN HCL 1 G
1500 VIAL (EA) INTRAVENOUS ONCE
Qty: 0 | Refills: 0 | Status: COMPLETED | OUTPATIENT
Start: 2019-04-29 | End: 2019-04-29

## 2019-04-29 RX ORDER — CELECOXIB 200 MG/1
200 CAPSULE ORAL
Qty: 0 | Refills: 0 | Status: DISCONTINUED | OUTPATIENT
Start: 2019-04-29 | End: 2019-05-01

## 2019-04-29 RX ORDER — CHLORHEXIDINE GLUCONATE 213 G/1000ML
1 SOLUTION TOPICAL ONCE
Qty: 0 | Refills: 0 | Status: COMPLETED | OUTPATIENT
Start: 2019-04-29 | End: 2019-04-29

## 2019-04-29 RX ORDER — APIXABAN 2.5 MG/1
2.5 TABLET, FILM COATED ORAL
Qty: 0 | Refills: 0 | Status: COMPLETED | OUTPATIENT
Start: 2019-04-30 | End: 2019-04-30

## 2019-04-29 RX ORDER — HYDROMORPHONE HYDROCHLORIDE 2 MG/ML
0.5 INJECTION INTRAMUSCULAR; INTRAVENOUS; SUBCUTANEOUS
Qty: 0 | Refills: 0 | Status: DISCONTINUED | OUTPATIENT
Start: 2019-04-29 | End: 2019-05-01

## 2019-04-29 RX ADMIN — Medication 400 MILLIGRAM(S): at 18:23

## 2019-04-29 RX ADMIN — CELECOXIB 200 MILLIGRAM(S): 200 CAPSULE ORAL at 18:24

## 2019-04-29 RX ADMIN — CHLORHEXIDINE GLUCONATE 1 APPLICATION(S): 213 SOLUTION TOPICAL at 06:00

## 2019-04-29 RX ADMIN — CELECOXIB 200 MILLIGRAM(S): 200 CAPSULE ORAL at 18:23

## 2019-04-29 RX ADMIN — PANTOPRAZOLE SODIUM 40 MILLIGRAM(S): 20 TABLET, DELAYED RELEASE ORAL at 05:44

## 2019-04-29 RX ADMIN — Medication 25 MILLIGRAM(S): at 17:30

## 2019-04-29 RX ADMIN — ROSUVASTATIN CALCIUM 10 MILLIGRAM(S): 5 TABLET ORAL at 22:28

## 2019-04-29 RX ADMIN — TRAMADOL HYDROCHLORIDE 100 MILLIGRAM(S): 50 TABLET ORAL at 23:01

## 2019-04-29 RX ADMIN — Medication 100 MILLIGRAM(S): at 21:37

## 2019-04-29 RX ADMIN — Medication 300 MILLIGRAM(S): at 23:41

## 2019-04-29 RX ADMIN — APREPITANT 40 MILLIGRAM(S): 80 CAPSULE ORAL at 09:17

## 2019-04-29 RX ADMIN — SODIUM CHLORIDE 100 MILLILITER(S): 9 INJECTION, SOLUTION INTRAVENOUS at 15:00

## 2019-04-29 RX ADMIN — SENNA PLUS 2 TABLET(S): 8.6 TABLET ORAL at 21:37

## 2019-04-29 RX ADMIN — Medication 1000 MILLIGRAM(S): at 18:23

## 2019-04-29 RX ADMIN — Medication 300 MILLIGRAM(S): at 08:41

## 2019-04-29 RX ADMIN — TRAMADOL HYDROCHLORIDE 100 MILLIGRAM(S): 50 TABLET ORAL at 22:28

## 2019-04-29 NOTE — PHYSICAL THERAPY INITIAL EVALUATION ADULT - PHYSICAL ASSIST/NONPHYSICAL ASSIST: GAIT, REHAB EVAL
2 person assist/one person as a block for knee buckling
1 person + 1 person to manage equipment/verbal cues

## 2019-04-29 NOTE — PHYSICAL THERAPY INITIAL EVALUATION ADULT - PLANNED THERAPY INTERVENTIONS, PT EVAL
bed mobility training/transfer training/gait training
bed mobility training/transfer training/gait training

## 2019-04-29 NOTE — BRIEF OPERATIVE NOTE - NSICDXBRIEFPROCEDURE_GEN_ALL_CORE_FT
PROCEDURES:  Left total knee arthroplasty 24-Apr-2019 10:19:22  Darrion Olguin
PROCEDURES:  Right total knee replacement 29-Apr-2019 14:23:44  Gasper Joaquin

## 2019-04-29 NOTE — PHYSICAL THERAPY INITIAL EVALUATION ADULT - PHYSICAL ASSIST/NONPHYSICAL ASSIST: SIT/STAND, REHAB EVAL
one person as a block for knee buckling/2 person assist
1 person + 1 person to manage equipment/verbal cues

## 2019-04-29 NOTE — PHYSICAL THERAPY INITIAL EVALUATION ADULT - BED MOBILITY TRAINING, PT EVAL
Pt will perform bed mobility independently in 3-5 sessions
Pt will perform bed mobility independently in 2-4 days.

## 2019-04-29 NOTE — PHYSICAL THERAPY INITIAL EVALUATION ADULT - TRANSFER SAFETY CONCERNS NOTED: SIT/STAND, REHAB EVAL
decreased step length/decreased weight-shifting ability
decreased step length/decreased weight-shifting ability

## 2019-04-29 NOTE — PHYSICAL THERAPY INITIAL EVALUATION ADULT - GAIT TRAINING, PT EVAL
Pt will ambulate 50 feet with contact guard with rolling walker in 3-5 days.
Pt will ambulate 150 ft independently with rolling walker in 2-4 days.

## 2019-04-29 NOTE — PHYSICAL THERAPY INITIAL EVALUATION ADULT - MANUAL MUSCLE TESTING RESULTS, REHAB EVAL
grossly assessed due to/at least 5/5 throughout bilateral UE, 4/5 left LE, 3+/5 right LE
grossly assessed due to/3/5 BLEs

## 2019-04-29 NOTE — PROGRESS NOTE ADULT - SUBJECTIVE AND OBJECTIVE BOX
Orthopaedic Post Op Note    Procedure: Right TKR  Surgeon: Yvonne Strange    68y Male comfortable. Still has numbness in the right foot.  Reported pain score = 2  Denies N/V, CP, SOB    PE:  Vital Signs Last 24 Hrs  T(C): 36.2 (29 Apr 2019 14:15), Max: 36.9 (28 Apr 2019 23:09)  T(F): 97.2 (29 Apr 2019 14:15), Max: 98.5 (29 Apr 2019 04:37)  HR: 72 (29 Apr 2019 16:00) (63 - 85)  BP: 126/70 (29 Apr 2019 16:00) (101/67 - 126/70)  RR: 16 (29 Apr 2019 16:00) (12 - 21)  SpO2: 100% (29 Apr 2019 16:00) (95% - 100%)  General: Pt alert and oriented   Lungs: + BS CTA bilaterally  Heart: +S1 & S2 heard, RRR  Abd: + BS heard, soft, NT, ND  Right Knee Dressing: C/D/I, functioning hemovac drain in place  Left Knee: incision CDI, prineo tape in place  Bilateral LEs:  Motor:   4/5 dorsiflexion, EHL; 5-/5 plantar flexion on Right; 5/5 dorsiflexion, plantarflexion, EHL on left  Unable to evaluate Sensation on left 2' to block still working; sensation intact to LT on left   2+ DP Pulses  SCDs in place    A/P: 68y Male POD#0 s/p Right TKR; POD # 5 s/p Left TKR  - Stable  - Acetaminophen, Celebrex, Dilaudid for Pain Control   - DVT ppx: Eliquis 2.5mg q 12h  - Jazmin op IV abx: Vancomycin  - PT, OT per protocol  - F/U AM Labs  DCP = Rehab Wednesday

## 2019-04-29 NOTE — PHYSICAL THERAPY INITIAL EVALUATION ADULT - DIAGNOSIS, PT EVAL
Impaired functional mobility
s/p right total knee replacement, stage 2. Weakness in bilateral LE, right greater than left.

## 2019-04-29 NOTE — PHYSICAL THERAPY INITIAL EVALUATION ADULT - IMPAIRED TRANSFERS: SIT/STAND, REHAB EVAL
pain/decreased strength/decreased ROM/decreased flexibility
decreased strength/pain/impaired balance/decreased ROM

## 2019-04-29 NOTE — PHYSICAL THERAPY INITIAL EVALUATION ADULT - ACTIVE RANGE OF MOTION EXAMINATION, REHAB EVAL
bilateral upper extremity Active ROM was WFL (within functional limits)/Staged total knee replacement, lacking flexion and extension in bilateral LE

## 2019-04-29 NOTE — PHYSICAL THERAPY INITIAL EVALUATION ADULT - ADDITIONAL COMMENTS
Pt lives in house with 4 steps to enter and 13 steps in house with railing  has RW will go to rehab after stage II
Pt lives in house with 4 steps to enter and 13 steps in house with railing  has RW will go to rehab after stage II

## 2019-04-29 NOTE — PHYSICAL THERAPY INITIAL EVALUATION ADULT - GAIT DEVIATIONS NOTED, PT EVAL
decreased velocity of limb motion/decreased jean carlos/decreased weight-shifting ability/decreased step length/decreased stride length
decreased jean carlos/decreased weight-shifting ability/decreased velocity of limb motion/decreased step length/decreased stride length

## 2019-04-29 NOTE — PHYSICAL THERAPY INITIAL EVALUATION ADULT - CRITERIA FOR SKILLED THERAPEUTIC INTERVENTIONS
therapy frequency/anticipated discharge recommendation/predicted duration of therapy intervention/impairments found/functional limitations in following categories
therapy frequency/anticipated equipment needs at discharge/anticipated discharge recommendation/functional limitations in following categories/predicted duration of therapy intervention/impairments found

## 2019-04-29 NOTE — PRE-OP CHECKLIST - SELECT TESTS ORDERED
CMP/Type and Screen/PT/PTT/Urinalysis/EKG/CXR/BMP/Results in MD note/CBC/INR
CBC/BMP/INR/EKG/Type and Screen/PT/PTT/CMP

## 2019-04-29 NOTE — PROGRESS NOTE ADULT - ASSESSMENT
68M with Aortic Aneurysm, GERD, OA and HLD admitted for staged procedure of bilateral Knees.     S/P BI TKR   Continue Bowel regimen and pain control regimen.   tramadol prn  PT/OT per protocol      Aortic Aneurysm  Metoprolol to reduce shearing forces and control BP    HLD  Resume statin    GERD  Protonix    History of Prostate CA and Rectal Cancer  Observe    Diet  Regular    DVT Prophylaxis  ASA  Disposition  Full Code/Inpatient  Discharge planning to [facility].

## 2019-04-29 NOTE — PROGRESS NOTE ADULT - SUBJECTIVE AND OBJECTIVE BOX
Patient is a 68y old  Male who presents with a chief complaint of staged tkr (28 Apr 2019 09:58)      INTERVAL HPI/OVERNIGHT EVENTS:  Pt is seen and examined.  s/p bilateral TKR.  Pain is controlled.    Pain Location & Control:     MEDICATIONS  (STANDING):  acetaminophen   Tablet .. 1000 milliGRAM(s) Oral every 8 hours  aspirin enteric coated      aspirin enteric coated 81 milliGRAM(s) Oral daily  atorvastatin 40 milliGRAM(s) Oral at bedtime  docusate sodium 100 milliGRAM(s) Oral three times a day  lactated ringers. 1000 milliLiter(s) (125 mL/Hr) IV Continuous <Continuous>  lactated ringers. 1000 milliLiter(s) (75 mL/Hr) IV Continuous <Continuous>  lactated ringers. 1000 milliLiter(s) (100 mL/Hr) IV Continuous <Continuous>  metoprolol succinate ER 25 milliGRAM(s) Oral daily  pantoprazole    Tablet 40 milliGRAM(s) Oral before breakfast  senna 2 Tablet(s) Oral at bedtime    MEDICATIONS  (PRN):  aluminum hydroxide/magnesium hydroxide/simethicone Suspension 30 milliLiter(s) Oral four times a day PRN Indigestion  bisacodyl Suppository 10 milliGRAM(s) Rectal daily PRN If no bowel movement by postoperative day #2  HYDROmorphone  Injectable 0.5 milliGRAM(s) IV Push every 10 minutes PRN Moderate Pain (4 - 6)  HYDROmorphone  Injectable 0.5 milliGRAM(s) IV Push every 3 hours PRN Severe Pain (7 - 10)  magnesium hydroxide Suspension 30 milliLiter(s) Oral daily PRN Constipation  ondansetron Injectable 4 milliGRAM(s) IV Push once PRN Nausea and/or Vomiting  ondansetron Injectable 4 milliGRAM(s) IV Push every 6 hours PRN Nausea and/or Vomiting  polyethylene glycol 3350 17 Gram(s) Oral daily PRN Constipation  promethazine 25 milliGRAM(s) Oral every 6 hours PRN nausea  traMADol 50 milliGRAM(s) Oral every 4 hours PRN Mild Pain (1 - 3)  traMADol 100 milliGRAM(s) Oral every 6 hours PRN Moderate Pain (4 - 6)      Allergies    No Known Allergies    Intolerances        Vital Signs Last 24 Hrs  T(C): 36.7 (29 Apr 2019 07:38), Max: 37.2 (28 Apr 2019 15:02)  T(F): 98.1 (29 Apr 2019 07:38), Max: 98.9 (28 Apr 2019 15:02)  HR: 71 (29 Apr 2019 07:38) (68 - 74)  BP: 111/69 (29 Apr 2019 07:38) (101/67 - 111/69)  BP(mean): --  RR: 16 (29 Apr 2019 07:38) (15 - 21)  SpO2: 98% (29 Apr 2019 07:38) (92% - 98%)        LABS:      Ca    9.0        28 Apr 2019 06:40      PT/INR - ( 28 Apr 2019 06:40 )   PT: 11.9 sec;   INR: 1.09 ratio         PTT - ( 28 Apr 2019 06:40 )  PTT:36.0 sec    CAPILLARY BLOOD GLUCOSE        RADIOLOGY & ADDITIONAL TESTS:    Imaging Personally Reviewed:  [ ] YES  [ ] NO    Consultant(s) Notes Reviewed:  [ ] YES  [ ] NO    Care Discussed with Consultants/Other Providers [x ] YES  [ ] NO

## 2019-04-29 NOTE — BRIEF OPERATIVE NOTE - NSICDXBRIEFPREOP_GEN_ALL_CORE_FT
PRE-OP DIAGNOSIS:  Primary osteoarthritis of left knee 24-Apr-2019 10:20:36  Darrion Olguin  Localized osteoarthritis of knees, bilateral 24-Apr-2019 10:19:54  Darrion Olguin
PRE-OP DIAGNOSIS:  Right knee DJD 29-Apr-2019 14:24:18  Gasper Joaquin

## 2019-04-29 NOTE — PHYSICAL THERAPY INITIAL EVALUATION ADULT - GENERAL OBSERVATIONS, REHAB EVAL
Pt supine in bed. +IV, +Supplemental oxygen, +PAS,  +hemovac
Pt received semi-supine in bed, (+) IC, (+) NC, (+) SCDs, (+) Hemovac, call bell in reach, NAD.

## 2019-04-29 NOTE — DIETITIAN INITIAL EVALUATION ADULT. - OTHER INFO
69 yo male s/p BL TKR, seen per LOS policy (</7 days) and is s/p stage II today.  Pt reports overall good appetite and intake on regular diet throughout week with the exception of this past saturday secondary to not feeling well.  Pt reports he has an appetite and is looking forward to diet initiation, diet to be advanced to full liquids/post op diet.  Meal changes obtained daily to optimize po intake.  Pt denies nutrition related questions or concerns at time of visit.

## 2019-04-30 ENCOUNTER — TRANSCRIPTION ENCOUNTER (OUTPATIENT)
Age: 69
End: 2019-04-30

## 2019-04-30 LAB
ANION GAP SERPL CALC-SCNC: 5 MMOL/L — SIGNIFICANT CHANGE UP (ref 5–17)
BUN SERPL-MCNC: 15 MG/DL — SIGNIFICANT CHANGE UP (ref 7–23)
CALCIUM SERPL-MCNC: 8.9 MG/DL — SIGNIFICANT CHANGE UP (ref 8.4–10.5)
CHLORIDE SERPL-SCNC: 103 MMOL/L — SIGNIFICANT CHANGE UP (ref 96–108)
CO2 SERPL-SCNC: 29 MMOL/L — SIGNIFICANT CHANGE UP (ref 22–31)
CREAT SERPL-MCNC: 1.12 MG/DL — SIGNIFICANT CHANGE UP (ref 0.5–1.3)
GLUCOSE SERPL-MCNC: 111 MG/DL — HIGH (ref 70–99)
HCT VFR BLD CALC: 34.2 % — LOW (ref 39–50)
HGB BLD-MCNC: 11.2 G/DL — LOW (ref 13–17)
MCHC RBC-ENTMCNC: 29.4 PG — SIGNIFICANT CHANGE UP (ref 27–34)
MCHC RBC-ENTMCNC: 32.7 GM/DL — SIGNIFICANT CHANGE UP (ref 32–36)
MCV RBC AUTO: 89.8 FL — SIGNIFICANT CHANGE UP (ref 80–100)
NRBC # BLD: 0 /100 WBCS — SIGNIFICANT CHANGE UP (ref 0–0)
PLATELET # BLD AUTO: 176 K/UL — SIGNIFICANT CHANGE UP (ref 150–400)
POTASSIUM SERPL-MCNC: 5.2 MMOL/L — SIGNIFICANT CHANGE UP (ref 3.5–5.3)
POTASSIUM SERPL-SCNC: 5.2 MMOL/L — SIGNIFICANT CHANGE UP (ref 3.5–5.3)
RBC # BLD: 3.81 M/UL — LOW (ref 4.2–5.8)
RBC # FLD: 13.2 % — SIGNIFICANT CHANGE UP (ref 10.3–14.5)
SODIUM SERPL-SCNC: 137 MMOL/L — SIGNIFICANT CHANGE UP (ref 135–145)
WBC # BLD: 10.15 K/UL — SIGNIFICANT CHANGE UP (ref 3.8–10.5)
WBC # FLD AUTO: 10.15 K/UL — SIGNIFICANT CHANGE UP (ref 3.8–10.5)

## 2019-04-30 PROCEDURE — 99233 SBSQ HOSP IP/OBS HIGH 50: CPT

## 2019-04-30 RX ADMIN — APIXABAN 2.5 MILLIGRAM(S): 2.5 TABLET, FILM COATED ORAL at 20:50

## 2019-04-30 RX ADMIN — Medication 400 MILLIGRAM(S): at 05:45

## 2019-04-30 RX ADMIN — SENNA PLUS 2 TABLET(S): 8.6 TABLET ORAL at 21:42

## 2019-04-30 RX ADMIN — Medication 100 MILLIGRAM(S): at 05:43

## 2019-04-30 RX ADMIN — CELECOXIB 200 MILLIGRAM(S): 200 CAPSULE ORAL at 20:50

## 2019-04-30 RX ADMIN — APIXABAN 2.5 MILLIGRAM(S): 2.5 TABLET, FILM COATED ORAL at 12:40

## 2019-04-30 RX ADMIN — ONDANSETRON 4 MILLIGRAM(S): 8 TABLET, FILM COATED ORAL at 11:29

## 2019-04-30 RX ADMIN — Medication 100 MILLIGRAM(S): at 21:42

## 2019-04-30 RX ADMIN — CELECOXIB 200 MILLIGRAM(S): 200 CAPSULE ORAL at 20:52

## 2019-04-30 RX ADMIN — Medication 200 MILLIGRAM(S): at 14:13

## 2019-04-30 RX ADMIN — TRAMADOL HYDROCHLORIDE 100 MILLIGRAM(S): 50 TABLET ORAL at 22:30

## 2019-04-30 RX ADMIN — Medication 1000 MILLIGRAM(S): at 07:34

## 2019-04-30 RX ADMIN — Medication 400 MILLIGRAM(S): at 01:17

## 2019-04-30 RX ADMIN — TRAMADOL HYDROCHLORIDE 100 MILLIGRAM(S): 50 TABLET ORAL at 05:43

## 2019-04-30 RX ADMIN — ROSUVASTATIN CALCIUM 10 MILLIGRAM(S): 5 TABLET ORAL at 21:42

## 2019-04-30 RX ADMIN — TRAMADOL HYDROCHLORIDE 100 MILLIGRAM(S): 50 TABLET ORAL at 15:03

## 2019-04-30 RX ADMIN — TRAMADOL HYDROCHLORIDE 100 MILLIGRAM(S): 50 TABLET ORAL at 15:35

## 2019-04-30 RX ADMIN — Medication 1000 MILLIGRAM(S): at 02:00

## 2019-04-30 RX ADMIN — TRAMADOL HYDROCHLORIDE 100 MILLIGRAM(S): 50 TABLET ORAL at 06:40

## 2019-04-30 RX ADMIN — TRAMADOL HYDROCHLORIDE 100 MILLIGRAM(S): 50 TABLET ORAL at 21:40

## 2019-04-30 RX ADMIN — Medication 1000 MILLIGRAM(S): at 20:49

## 2019-04-30 RX ADMIN — Medication 1000 MILLIGRAM(S): at 20:52

## 2019-04-30 RX ADMIN — PANTOPRAZOLE SODIUM 40 MILLIGRAM(S): 20 TABLET, DELAYED RELEASE ORAL at 05:43

## 2019-04-30 NOTE — DISCHARGE NOTE PROVIDER - CARE PROVIDER_API CALL
Yvonne Strange)  Orthopedics  [address], [facility] [address]  [place] [address]  Phone: [phone]  Fax: [phone]  Follow Up Time:

## 2019-04-30 NOTE — OCCUPATIONAL THERAPY INITIAL EVALUATION ADULT - PLANNED THERAPY INTERVENTIONS, OT EVAL
transfer training/ADL retraining/bed mobility training
IADL retraining/transfer training/ADL retraining

## 2019-04-30 NOTE — OCCUPATIONAL THERAPY INITIAL EVALUATION ADULT - LEVEL OF INDEPENDENCE: SIT/STAND, REHAB EVAL
Extensive bilateral pulmonary emboli with saddle embolus and suspicion of   mild right heart strain. POCUS ED showed no R heart strain. HD stable with no noted hypotension. Endorsed past negative hypercoaguable workup. Up to date with mammography. Last colonoscopy 5+ years ago. Last PAP unknown.  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
Extensive bilateral pulmonary emboli with saddle embolus and suspicion of   mild right heart strain. POCUS ED showed no R heart strain. HD stable with no noted hypotension. Endorsed past negative hypercoaguable workup. Up to date with mammography. Last colonoscopy 5+ years ago. Last PAP unknown.  - will transition to apixaban at next dose this evening  - Wean supplemental O2 to room air  - TTE negative for RV dysfunction
Extensive bilateral pulmonary emboli with saddle embolus and suspicion of   mild right heart strain. POCUS ED showed no R heart strain. HD stable with no noted hypotension. Endorsed past negative hypercoaguable workup. Up to date with mammography. Last colonoscopy 5+ years ago. Last PAP unknown.  - Will transition heparin gtt to Lovenox with eventual PO AC  - Supplemental O2 as needed  - IVF as need if becomes hypotensive  - Low threshold for MICU consult if decompensates
Extensive bilateral pulmonary emboli with saddle embolus and suspicion of   mild right heart strain. POCUS ED showed no R heart strain. HD stable with no noted hypotension. Endorsed past negative hypercoaguable workup. Up to date with mammography. Last colonoscopy 5+ years ago. Last PAP unknown.  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
Extensive bilateral pulmonary emboli with saddle embolus and suspicion of   mild right heart strain. POCUS ED showed no R heart strain. HD stable with no noted hypotension. Endorsed past negative hypercoaguable workup. Up to date with mammography. Last colonoscopy 5+ years ago. Last PAP unknown.  - c/w Eliquis, will likely need lifelong AC given recurrent VTE  - Will dc with home O2  - IVF as need if becomes hypotensive
Extensive bilateral pulmonary emboli with saddle embolus and suspicion of   mild right heart strain. POCUS ED showed no R heart strain. HD stable with no noted hypotension. Endorsed past negative hypercoaguable workup. Up to date with mammography. Last colonoscopy 5+ years ago. Last PAP unknown.  - c/w Lovenox with eventual PO AC, will likely need lifelong AC given recurrent VTE  - Supplemental O2 as needed  - IVF as need if becomes hypotensive  - Low threshold for MICU consult if decompensates
Extensive bilateral pulmonary emboli with saddle embolus and suspicion of   mild right heart strain. POCUS ED showed no R heart strain. HD stable with no noted hypotension. Endorsed past negative hypercoaguable workup. Up to date with mammography. Last colonoscopy 5+ years ago. Last PAP unknown.  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
Extensive bilateral pulmonary emboli with saddle embolus and suspicion of   mild right heart strain. POCUS ED showed no R heart strain. HD stable with no noted hypotension. Endorsed past negative hypercoaguable workup. Up to date with mammography. Last colonoscopy 5+ years ago. Last PAP unknown.  - Transition to Eliquis, will likely need lifelong AC given recurrent VTE  - Supplemental O2 as needed, will attempt to wean off  - IVF as need if becomes hypotensive  - Low threshold for MICU consult if decompensates
minimum assist (75% patients effort)
minimum assist (75% patients effort)

## 2019-04-30 NOTE — DISCHARGE NOTE PROVIDER - NSDCCPCAREPLAN_GEN_ALL_CORE_FT
PRINCIPAL DISCHARGE DIAGNOSIS  Diagnosis: Primary osteoarthritis of both knees  Assessment and Plan of Treatment: Physical Therapy/Occupational Therapy for ambulation, transfers, stairs, ADL's, Range of Motion Exercises, Isometrics.  Full weight bearing as tolerated with rolling walker  Range of Motion Goals: Flexion 120 degrees; Extension 0 degrees  Keep incision clean and dry.  Suture/prineo dressing removal 14 days after surgery at rehab facility or Surgeon's office  May shower post-op day #5 if no drainage from incision  Follow up with your primary care physician within 2-3 weeks of discharge home

## 2019-04-30 NOTE — PROGRESS NOTE ADULT - ASSESSMENT
68M with Aortic Aneurysm, GERD, OA and HLD admitted for staged procedure of bilateral Knees.     S/P BI TKR   Continue Bowel regimen and pain control regimen.   tramadol prn  PT/OT per protocol      Aortic Aneurysm  Metoprolol to reduce shearing forces and control BP    HLD  Resume statin    GERD  Protonix    History of Prostate CA and Rectal Cancer  Observe    Nausea/dizziness  IVF.  Diet  Regular    DVT Prophylaxis  ASA  Disposition  Full Code/Inpatient  Discharge planning to [facility].

## 2019-04-30 NOTE — OCCUPATIONAL THERAPY INITIAL EVALUATION ADULT - TRANSFER TRAINING, PT EVAL
Patient will transfer to toilet with DME as needed with minimal assistance within 2-3 sessions.
Patient will transfer to toilet with DME as needed with minimal assistance with in 3-5 sessions.

## 2019-04-30 NOTE — PROGRESS NOTE ADULT - SUBJECTIVE AND OBJECTIVE BOX
Patient is a 68y old  Male who presents with a chief complaint of staged tkr (29 Apr 2019 14:53)      INTERVAL HPI/OVERNIGHT EVENTS:  Pt is seen and examined.  c/o nausea and dizziness this morning.      Pain Location & Control:     MEDICATIONS  (STANDING):  acetaminophen   Tablet .. 1000 milliGRAM(s) Oral every 8 hours  apixaban 2.5 milliGRAM(s) Oral <User Schedule>  aspirin enteric coated      aspirin enteric coated 81 milliGRAM(s) Oral daily  celecoxib 200 milliGRAM(s) Oral two times a day  docusate sodium 100 milliGRAM(s) Oral three times a day  lactated ringers. 1000 milliLiter(s) (100 mL/Hr) IV Continuous <Continuous>  lactated ringers. 1000 milliLiter(s) (75 mL/Hr) IV Continuous <Continuous>  metoprolol succinate ER 25 milliGRAM(s) Oral daily  pantoprazole    Tablet 40 milliGRAM(s) Oral before breakfast  rosuvastatin 10 milliGRAM(s) Oral at bedtime  senna 2 Tablet(s) Oral at bedtime    MEDICATIONS  (PRN):  aluminum hydroxide/magnesium hydroxide/simethicone Suspension 30 milliLiter(s) Oral four times a day PRN Indigestion  HYDROmorphone  Injectable 0.5 milliGRAM(s) IV Push every 3 hours PRN Severe Pain (7 - 10)  magnesium hydroxide Suspension 30 milliLiter(s) Oral daily PRN Constipation  ondansetron Injectable 4 milliGRAM(s) IV Push every 6 hours PRN Nausea and/or Vomiting  polyethylene glycol 3350 17 Gram(s) Oral daily PRN Constipation  promethazine 25 milliGRAM(s) Oral every 6 hours PRN nausea  traMADol 50 milliGRAM(s) Oral every 6 hours PRN Mild Pain (1 - 3)  traMADol 100 milliGRAM(s) Oral every 6 hours PRN Moderate Pain (4 - 6)      Allergies    No Known Allergies    Intolerances      Vital Signs Last 24 Hrs  T(C): 37.1 (30 Apr 2019 03:00), Max: 37.1 (30 Apr 2019 03:00)  T(F): 98.7 (30 Apr 2019 03:00), Max: 98.7 (30 Apr 2019 03:00)  HR: 61 (30 Apr 2019 08:15) (61 - 85)  BP: 101/62 (30 Apr 2019 08:15) (95/57 - 126/70)  BP(mean): --  RR: 20 (30 Apr 2019 08:15) (12 - 20)  SpO2: 97% (30 Apr 2019 08:15) (95% - 100%)        LABS:    29 Apr 2019 18:25    140    |  105    |  17     ----------------------------<  135    4.2     |  28     |  1.17     Ca    8.6        29 Apr 2019 18:25          RADIOLOGY & ADDITIONAL TESTS:    Imaging Personally Reviewed:  [ ] YES  [ ] NO    Consultant(s) Notes Reviewed:  [ x] YES  [ ] NO    Care Discussed with Consultants/Other Providers [x ] YES  [ ] NO

## 2019-04-30 NOTE — PROGRESS NOTE ADULT - SUBJECTIVE AND OBJECTIVE BOX
Post Op Day # 1    SUBJECTIVE    69yo Male status post right TKR; left TKR 4/24/19 .   Patient is alert and comfortable.    Pain is controlled with current pain regimen.  Denies chest pain, shortness of breath, abdominal pain or fever.   Complains of nausea, lightheadedness this am when OOB    OBJECTIVE    Vital Signs Last 24 Hrs  T(C): 37.1 (30 Apr 2019 03:00), Max: 37.1 (30 Apr 2019 03:00)  T(F): 98.7 (30 Apr 2019 03:00), Max: 98.7 (30 Apr 2019 03:00)  HR: 61 (30 Apr 2019 08:15) (61 - 85)  BP: 101/62 (30 Apr 2019 08:15) (95/57 - 126/70)  BP(mean): --  RR: 20 (30 Apr 2019 08:15) (12 - 20)  SpO2: 97% (30 Apr 2019 08:15) (95% - 100%)  I&O's Summary    29 Apr 2019 07:01  -  30 Apr 2019 07:00  --------------------------------------------------------  IN: 1550 mL / OUT: 1530 mL / NET: 20 mL    30 Apr 2019 07:01  -  30 Apr 2019 09:30  --------------------------------------------------------  IN: 0 mL / OUT: 800 mL / NET: -800 mL        PHYSICAL EXAM    Right knee* dressing  is clean, dry and intact.   Left knee incision is C/D/I  No erythema/ No exudate/ No blistering/ No ecchymosis.   The calf is supple/nontender.   Sensation to light touch is grossly intact distally.   Motor function distally is intact.   No foot drop.   (2+) dorsalis pedis pulse. Capillary refill is less than 2 seconds. No cyanosis.      29 Apr 2019 18:25    140    |  105    |  17     ----------------------------<  135<H>  4.2     |  28     |  1.17     Ca    8.6        29 Apr 2019 18:25        ASSESSMENT AND PLAN  - Orthopedically stable  - Nausea: zofran   - F/U labs  - DVT prophylaxis: PAS + Eliquis  - Continue physical therapy and occupational therapy  - Weight bearing as tolerated of the right lower extremity with assistance of a walker  - Incentive spirometry encouraged  - Pain control as clinically indicated  - Disposition:  subacute rehabilitation

## 2019-04-30 NOTE — OCCUPATIONAL THERAPY INITIAL EVALUATION ADULT - DIAGNOSIS, OT EVAL
Patient with decreased ADL status and impairments with functional mobility.
Pt with decreased adl status and functional mobility.

## 2019-04-30 NOTE — OCCUPATIONAL THERAPY INITIAL EVALUATION ADULT - ADL RETRAINING, OT EVAL
Patient will dress lower body with minimal assistance, AE as needed within 2-3 sessions.
Patient will dress lower body with minimal assistance, AE as needed with in 3-5 sessions.

## 2019-04-30 NOTE — DISCHARGE NOTE PROVIDER - NSDCCPTREATMENT_GEN_ALL_CORE_FT
PRINCIPAL PROCEDURE  Procedure: Left total knee arthroplasty  Findings and Treatment:       SECONDARY PROCEDURE  Procedure: Right total knee arthroplasty  Findings and Treatment:

## 2019-04-30 NOTE — DISCHARGE NOTE PROVIDER - HOSPITAL COURSE
The patient is a  y.o. male with severe bilateral osteoarthritis of bilateral knees.     After admission on 4/24/19  and receiving pre-operative parenteral prophylactic antibiotics, the patient  underwent an  uncomplicated left TKR by orthopedic surgeon Dr. Strange.     Lovenox was given for D/VT prophylaxis after the first surgery. The patient was optimized medically to undergo a staged procedure of the right knee on 4/29/19.     A medical consultation from the Hospitalist service was obtained for post-operative medical co-management. Typical Physical & occupational therapy modalities post total knee replacement were performed including ambulation training, range of motion, ADL's, and transfers. Eliquis was given for DVT prophylaxis after the 2nd surgery.    The patient had a clean appearing surgical incisions with no sign of surgical site infections and had a stable neuro / vascular exam of the operated extremity.  After progression of mobility guided by the PT/ OT staff,  the patient was felt to benefit from further rehabilitative care for restoration to level of function. This was felt to best be accomplished in a rehab facility.  Discharge and Orthopedic Care instructions were delineated in the Discharge Plan and reviewed with the patient. All medications were delineated in the medication reconciliation tool and key points were reviewed with the patient. They were deemed stable from an Orthopedic & medical standpoint for discharge today.    Upon discharge from the rehab facility, he will  follow up with Dr. Strange for further Orthopedic care.

## 2019-04-30 NOTE — OCCUPATIONAL THERAPY INITIAL EVALUATION ADULT - NS ASR FOLLOW COMMAND OT EVAL
100% of the time/Patient educated regarding fall prevention and home/hospital safety. Pt educated on use of AE/DME recommended for safety & the need to call for assistance. Patient with good understanding. Role of OT and patient goals discussed. Patient educated regarding diagnosis specific precautions and provided with educational folder including goals, expectations and ther ex. Pt educated on role and goal of OT. Discharge planning and recommendations reviewed with the patient. Case management/ Social work notified .
100% of the time

## 2019-04-30 NOTE — PROGRESS NOTE ADULT - SUBJECTIVE AND OBJECTIVE BOX
Discharge medication calendar:  (ASA 81mg Qday preop)  Eliquis 2.5mg q12h plus ASA EC 81mg Qday x 12 days then ASA EC 81mg q12h x 4 weeks then resume ASA 81mg Qday  APAP 1000mg q8h x 2-3 weeks  Celecoxib 200mg q12h x 2-3 weeks  Pantoprazole 40mg QAM x 6 weeks  Narcotic PRN  Docusate 100mg TID while taking narcotic  Miralax, Senna, or Bisacodyl PRN for treatment of constipation

## 2019-04-30 NOTE — DISCHARGE NOTE PROVIDER - NSDCACTIVITY_GEN_ALL_CORE
Stairs allowed/No heavy lifting/straining/Do not drive or operate machinery/Walking - Indoors allowed

## 2019-05-01 ENCOUNTER — TRANSCRIPTION ENCOUNTER (OUTPATIENT)
Age: 69
End: 2019-05-01

## 2019-05-01 VITALS
OXYGEN SATURATION: 98 % | TEMPERATURE: 99 F | DIASTOLIC BLOOD PRESSURE: 79 MMHG | SYSTOLIC BLOOD PRESSURE: 118 MMHG | HEART RATE: 96 BPM | RESPIRATION RATE: 16 BRPM

## 2019-05-01 LAB
ANION GAP SERPL CALC-SCNC: 5 MMOL/L — SIGNIFICANT CHANGE UP (ref 5–17)
BUN SERPL-MCNC: 16 MG/DL — SIGNIFICANT CHANGE UP (ref 7–23)
CALCIUM SERPL-MCNC: 8.8 MG/DL — SIGNIFICANT CHANGE UP (ref 8.4–10.5)
CHLORIDE SERPL-SCNC: 104 MMOL/L — SIGNIFICANT CHANGE UP (ref 96–108)
CO2 SERPL-SCNC: 28 MMOL/L — SIGNIFICANT CHANGE UP (ref 22–31)
CREAT SERPL-MCNC: 1.14 MG/DL — SIGNIFICANT CHANGE UP (ref 0.5–1.3)
GLUCOSE SERPL-MCNC: 129 MG/DL — HIGH (ref 70–99)
HCT VFR BLD CALC: 31.1 % — LOW (ref 39–50)
HGB BLD-MCNC: 10 G/DL — LOW (ref 13–17)
MCHC RBC-ENTMCNC: 28.8 PG — SIGNIFICANT CHANGE UP (ref 27–34)
MCHC RBC-ENTMCNC: 32.2 GM/DL — SIGNIFICANT CHANGE UP (ref 32–36)
MCV RBC AUTO: 89.6 FL — SIGNIFICANT CHANGE UP (ref 80–100)
NRBC # BLD: 0 /100 WBCS — SIGNIFICANT CHANGE UP (ref 0–0)
PLATELET # BLD AUTO: 173 K/UL — SIGNIFICANT CHANGE UP (ref 150–400)
POTASSIUM SERPL-MCNC: 4.3 MMOL/L — SIGNIFICANT CHANGE UP (ref 3.5–5.3)
POTASSIUM SERPL-SCNC: 4.3 MMOL/L — SIGNIFICANT CHANGE UP (ref 3.5–5.3)
RBC # BLD: 3.47 M/UL — LOW (ref 4.2–5.8)
RBC # FLD: 13.2 % — SIGNIFICANT CHANGE UP (ref 10.3–14.5)
SODIUM SERPL-SCNC: 137 MMOL/L — SIGNIFICANT CHANGE UP (ref 135–145)
WBC # BLD: 8.88 K/UL — SIGNIFICANT CHANGE UP (ref 3.8–10.5)
WBC # FLD AUTO: 8.88 K/UL — SIGNIFICANT CHANGE UP (ref 3.8–10.5)

## 2019-05-01 PROCEDURE — 86900 BLOOD TYPING SEROLOGIC ABO: CPT

## 2019-05-01 PROCEDURE — 36415 COLL VENOUS BLD VENIPUNCTURE: CPT

## 2019-05-01 PROCEDURE — 86901 BLOOD TYPING SEROLOGIC RH(D): CPT

## 2019-05-01 PROCEDURE — 93970 EXTREMITY STUDY: CPT

## 2019-05-01 PROCEDURE — 97535 SELF CARE MNGMENT TRAINING: CPT

## 2019-05-01 PROCEDURE — 88311 DECALCIFY TISSUE: CPT

## 2019-05-01 PROCEDURE — 97116 GAIT TRAINING THERAPY: CPT

## 2019-05-01 PROCEDURE — C1713: CPT

## 2019-05-01 PROCEDURE — C1889: CPT

## 2019-05-01 PROCEDURE — 85018 HEMOGLOBIN: CPT

## 2019-05-01 PROCEDURE — 85014 HEMATOCRIT: CPT

## 2019-05-01 PROCEDURE — 73562 X-RAY EXAM OF KNEE 3: CPT

## 2019-05-01 PROCEDURE — 85027 COMPLETE CBC AUTOMATED: CPT

## 2019-05-01 PROCEDURE — 86850 RBC ANTIBODY SCREEN: CPT

## 2019-05-01 PROCEDURE — 88305 TISSUE EXAM BY PATHOLOGIST: CPT

## 2019-05-01 PROCEDURE — 80048 BASIC METABOLIC PNL TOTAL CA: CPT

## 2019-05-01 PROCEDURE — 99238 HOSP IP/OBS DSCHRG MGMT 30/<: CPT

## 2019-05-01 PROCEDURE — 97165 OT EVAL LOW COMPLEX 30 MIN: CPT

## 2019-05-01 PROCEDURE — 97530 THERAPEUTIC ACTIVITIES: CPT

## 2019-05-01 PROCEDURE — 97168 OT RE-EVAL EST PLAN CARE: CPT

## 2019-05-01 PROCEDURE — 99239 HOSP IP/OBS DSCHRG MGMT >30: CPT

## 2019-05-01 PROCEDURE — 85730 THROMBOPLASTIN TIME PARTIAL: CPT

## 2019-05-01 PROCEDURE — C1776: CPT

## 2019-05-01 PROCEDURE — 85610 PROTHROMBIN TIME: CPT

## 2019-05-01 PROCEDURE — 97164 PT RE-EVAL EST PLAN CARE: CPT

## 2019-05-01 PROCEDURE — 97110 THERAPEUTIC EXERCISES: CPT

## 2019-05-01 PROCEDURE — 97161 PT EVAL LOW COMPLEX 20 MIN: CPT

## 2019-05-01 RX ORDER — LIDOCAINE 4 G/100G
2 CREAM TOPICAL DAILY
Qty: 0 | Refills: 0 | Status: DISCONTINUED | OUTPATIENT
Start: 2019-05-01 | End: 2019-05-01

## 2019-05-01 RX ORDER — PANTOPRAZOLE SODIUM 20 MG/1
1 TABLET, DELAYED RELEASE ORAL
Qty: 0 | Refills: 0 | DISCHARGE
Start: 2019-05-01

## 2019-05-01 RX ORDER — DIAZEPAM 5 MG
5 TABLET ORAL EVERY 8 HOURS
Qty: 0 | Refills: 0 | Status: DISCONTINUED | OUTPATIENT
Start: 2019-05-01 | End: 2019-05-01

## 2019-05-01 RX ORDER — DIAZEPAM 5 MG
1 TABLET ORAL
Qty: 0 | Refills: 0 | DISCHARGE
Start: 2019-05-01

## 2019-05-01 RX ORDER — CELECOXIB 200 MG/1
1 CAPSULE ORAL
Qty: 0 | Refills: 0 | DISCHARGE
Start: 2019-05-01

## 2019-05-01 RX ORDER — DOCUSATE SODIUM 100 MG
1 CAPSULE ORAL
Qty: 0 | Refills: 0 | DISCHARGE
Start: 2019-05-01

## 2019-05-01 RX ORDER — APIXABAN 2.5 MG/1
1 TABLET, FILM COATED ORAL
Qty: 0 | Refills: 0 | DISCHARGE
Start: 2019-05-01

## 2019-05-01 RX ORDER — SENNA PLUS 8.6 MG/1
2 TABLET ORAL
Qty: 0 | Refills: 0 | DISCHARGE
Start: 2019-05-01

## 2019-05-01 RX ORDER — FAMOTIDINE 10 MG/ML
1 INJECTION INTRAVENOUS
Qty: 0 | Refills: 0 | COMMUNITY

## 2019-05-01 RX ORDER — TRAMADOL HYDROCHLORIDE 50 MG/1
2 TABLET ORAL
Qty: 0 | Refills: 0 | DISCHARGE
Start: 2019-05-01

## 2019-05-01 RX ORDER — TRAMADOL HYDROCHLORIDE 50 MG/1
1 TABLET ORAL
Qty: 0 | Refills: 0 | DISCHARGE
Start: 2019-05-01

## 2019-05-01 RX ORDER — LIDOCAINE 4 G/100G
1 CREAM TOPICAL
Qty: 0 | Refills: 0 | DISCHARGE
Start: 2019-05-01

## 2019-05-01 RX ORDER — ACETAMINOPHEN 500 MG
2 TABLET ORAL
Qty: 0 | Refills: 0 | DISCHARGE
Start: 2019-05-01

## 2019-05-01 RX ADMIN — TRAMADOL HYDROCHLORIDE 100 MILLIGRAM(S): 50 TABLET ORAL at 13:18

## 2019-05-01 RX ADMIN — Medication 100 MILLIGRAM(S): at 05:15

## 2019-05-01 RX ADMIN — Medication 1000 MILLIGRAM(S): at 05:15

## 2019-05-01 RX ADMIN — CELECOXIB 200 MILLIGRAM(S): 200 CAPSULE ORAL at 09:05

## 2019-05-01 RX ADMIN — Medication 1000 MILLIGRAM(S): at 11:51

## 2019-05-01 RX ADMIN — TRAMADOL HYDROCHLORIDE 100 MILLIGRAM(S): 50 TABLET ORAL at 08:45

## 2019-05-01 RX ADMIN — APIXABAN 2.5 MILLIGRAM(S): 2.5 TABLET, FILM COATED ORAL at 09:05

## 2019-05-01 RX ADMIN — Medication 100 MILLIGRAM(S): at 13:18

## 2019-05-01 RX ADMIN — PANTOPRAZOLE SODIUM 40 MILLIGRAM(S): 20 TABLET, DELAYED RELEASE ORAL at 05:15

## 2019-05-01 RX ADMIN — TRAMADOL HYDROCHLORIDE 100 MILLIGRAM(S): 50 TABLET ORAL at 14:15

## 2019-05-01 RX ADMIN — LIDOCAINE 2 PATCH: 4 CREAM TOPICAL at 11:51

## 2019-05-01 RX ADMIN — Medication 81 MILLIGRAM(S): at 11:51

## 2019-05-01 RX ADMIN — TRAMADOL HYDROCHLORIDE 100 MILLIGRAM(S): 50 TABLET ORAL at 08:06

## 2019-05-01 RX ADMIN — Medication 1000 MILLIGRAM(S): at 11:52

## 2019-05-01 RX ADMIN — Medication 1000 MILLIGRAM(S): at 05:17

## 2019-05-01 NOTE — PROGRESS NOTE ADULT - PROVIDER SPECIALTY LIST ADULT
Hospitalist
Orthopedics
Pharmacy
Orthopedics
Hospitalist

## 2019-05-01 NOTE — PROGRESS NOTE ADULT - SUBJECTIVE AND OBJECTIVE BOX
Patient is a 68y old  Male who presents with a chief complaint of Osteoarthritis of bilateral knees (30 Apr 2019 11:21)      INTERVAL HPI/OVERNIGHT EVENTS:  Pt is seen and examined.  Feels better today. Denies any dizziness, nausea or vomiting.  Pain is controlled.  Pain Location & Control:     MEDICATIONS  (STANDING):  acetaminophen   Tablet .. 1000 milliGRAM(s) Oral every 8 hours  apixaban 2.5 milliGRAM(s) Oral every 12 hours  aspirin enteric coated      aspirin enteric coated 81 milliGRAM(s) Oral daily  celecoxib 200 milliGRAM(s) Oral two times a day  docusate sodium 100 milliGRAM(s) Oral three times a day  lactated ringers. 1000 milliLiter(s) (100 mL/Hr) IV Continuous <Continuous>  lactated ringers. 1000 milliLiter(s) (75 mL/Hr) IV Continuous <Continuous>  metoprolol succinate ER 25 milliGRAM(s) Oral daily  pantoprazole    Tablet 40 milliGRAM(s) Oral before breakfast  rosuvastatin 10 milliGRAM(s) Oral at bedtime  senna 2 Tablet(s) Oral at bedtime    MEDICATIONS  (PRN):  aluminum hydroxide/magnesium hydroxide/simethicone Suspension 30 milliLiter(s) Oral four times a day PRN Indigestion  bisacodyl Suppository 10 milliGRAM(s) Rectal daily PRN If no bowel movement by postoperative day #2  HYDROmorphone  Injectable 0.5 milliGRAM(s) IV Push every 3 hours PRN Severe Pain (7 - 10)  magnesium hydroxide Suspension 30 milliLiter(s) Oral daily PRN Constipation  ondansetron Injectable 4 milliGRAM(s) IV Push every 6 hours PRN Nausea and/or Vomiting  polyethylene glycol 3350 17 Gram(s) Oral daily PRN Constipation  promethazine 25 milliGRAM(s) Oral every 6 hours PRN nausea  traMADol 50 milliGRAM(s) Oral every 6 hours PRN Mild Pain (1 - 3)  traMADol 100 milliGRAM(s) Oral every 6 hours PRN Moderate Pain (4 - 6)      Allergies    No Known Allergies    Intolerances        Vital Signs Last 24 Hrs  T(C): 36.8 (01 May 2019 07:04), Max: 37.4 (30 Apr 2019 23:04)  T(F): 98.2 (01 May 2019 07:04), Max: 99.3 (30 Apr 2019 23:04)  HR: 75 (01 May 2019 07:04) (75 - 92)  BP: 99/64 (01 May 2019 07:04) (97/64 - 118/71)  BP(mean): --  RR: 16 (01 May 2019 07:04) (15 - 18)  SpO2: 96% (01 May 2019 07:04) (96% - 97%)        LABS:                        10.0   8.88  )-----------( 173      ( 01 May 2019 07:28 )             31.1     01 May 2019 07:28    137    |  104    |  16     ----------------------------<  129    4.3     |  28     |  1.14     Ca    8.8        01 May 2019 07:28          CAPILLARY BLOOD GLUCOSE            RADIOLOGY & ADDITIONAL TESTS:    Imaging Personally Reviewed:  [ ] YES  [ ] NO    Consultant(s) Notes Reviewed:  [ ] YES  [ ] NO    Care Discussed with Consultants/Other Providers [ x] YES  [ ] NO

## 2019-05-01 NOTE — PROGRESS NOTE ADULT - ASSESSMENT
68M with Aortic Aneurysm, GERD, OA and HLD admitted for staged procedure of bilateral Knees.     S/P BI TKR   Continue Bowel regimen and pain control regimen.   tramadol prn  PT/OT per protocol      Aortic Aneurysm  Metoprolol to reduce shearing forces and control BP    HLD  Resume statin    GERD  Protonix    History of Prostate CA and Rectal Cancer  Observe    Nausea/dizziness  resolved.  Diet  Regular    DVT Prophylaxis  ASA  Disposition  Full Code/Inpatient  Patient is medically stable pending ortho and PT clearance.

## 2019-05-01 NOTE — PROGRESS NOTE ADULT - SUBJECTIVE AND OBJECTIVE BOX
POST OPERATIVE DAY #: 2    68y Male  s/p  Right  TKA          Left TKR 4/24    SUBJECTIVE: Patient seen and examined at bedside.     Pain:  well controlled      Pain scale:   3/10  Denies CP, SOB, N/V/D, weakness, numbness   No new complains     OBJECTIVE:     Vital Signs Last 24 Hrs  T(C): 36.8 (01 May 2019 07:04), Max: 37.4 (30 Apr 2019 23:04)  T(F): 98.2 (01 May 2019 07:04), Max: 99.3 (30 Apr 2019 23:04)  HR: 96 (01 May 2019 09:03) (75 - 96)  BP: 121/73 (01 May 2019 09:03) (97/64 - 121/73)  BP(mean): --  RR: 16 (01 May 2019 07:04) (15 - 18)  SpO2: 96% (01 May 2019 07:04) (96% - 97%)    Affected extremity: RLE         Dressing: changed, incision clean/dry/intact  prineo                   HV removed         Sensation: intact to light touch          Motor exam:   5/ 5 Tibialis Anterior/Gastrocnemius-Soleus, EHL/FHL         warm, well-perfused; capillary refill < 3 seconds         negative calf tenderness B/L LE    LABS:                        10.0   8.88  )-----------( 173      ( 01 May 2019 07:28 )             31.1     05-01    137  |  104  |  16  ----------------------------<  129<H>  4.3   |  28  |  1.14    Ca    8.8      01 May 2019 07:28          MEDICATIONS:      Pain management:  acetaminophen   Tablet .. 1000 milliGRAM(s) Oral every 8 hours  celecoxib 200 milliGRAM(s) Oral two times a day  HYDROmorphone  Injectable 0.5 milliGRAM(s) IV Push every 3 hours PRN  ondansetron Injectable 4 milliGRAM(s) IV Push every 6 hours PRN  promethazine 25 milliGRAM(s) Oral every 6 hours PRN  traMADol 50 milliGRAM(s) Oral every 6 hours PRN  traMADol 100 milliGRAM(s) Oral every 6 hours PRN    DVT prophylaxis:   apixaban 2.5 milliGRAM(s) Oral every 12 hours  aspirin enteric coated      aspirin enteric coated 81 milliGRAM(s) Oral daily      RADIOLOGY & ADDITIONAL STUDIES:    ASSESSMENT AND PLAN:     - Analgesic pain control  - DVT prophylaxis: ASA 81 mg daily       Eliquis2.5mg twice a day    SCDs       - Pain Management: Celebrex 200mg twice a day x 21 days   - PT/OT: Weight Bearing Status:  Weight bearing as tolerated, OOBTC           -  Incentive spirometry  - IVF  - Advance diet as tolerated  - Hospitalist is following  -  Follow up labs  -  Disposition:  Subacute Rehab POST OPERATIVE DAY #: 2    68y Male  s/p  Right  TKA          Left TKR 4/24    SUBJECTIVE: Patient seen and examined at bedside.     Pain:  well controlled      Pain scale:   3/10  Denies CP, SOB, N/V/D, weakness, numbness   No new complains     OBJECTIVE:     Vital Signs Last 24 Hrs  T(C): 36.8 (01 May 2019 07:04), Max: 37.4 (30 Apr 2019 23:04)  T(F): 98.2 (01 May 2019 07:04), Max: 99.3 (30 Apr 2019 23:04)  HR: 96 (01 May 2019 09:03) (75 - 96)  BP: 121/73 (01 May 2019 09:03) (97/64 - 121/73)  BP(mean): --  RR: 16 (01 May 2019 07:04) (15 - 18)  SpO2: 96% (01 May 2019 07:04) (96% - 97%)    Affected extremity: RLE         Dressing: changed, incision clean/dry/intact  prineo                   HV removed         Sensation: intact to light touch          Motor exam:   5/ 5 Tibialis Anterior/Gastrocnemius-Soleus, EHL/FHL         warm, well-perfused; capillary refill < 3 seconds         negative calf tenderness B/L LE      LABS:                        10.0   8.88  )-----------( 173      ( 01 May 2019 07:28 )             31.1     05-01    137  |  104  |  16  ----------------------------<  129<H>  4.3   |  28  |  1.14    Ca    8.8      01 May 2019 07:28          MEDICATIONS:      Pain management:  acetaminophen   Tablet .. 1000 milliGRAM(s) Oral every 8 hours  celecoxib 200 milliGRAM(s) Oral two times a day  HYDROmorphone  Injectable 0.5 milliGRAM(s) IV Push every 3 hours PRN  ondansetron Injectable 4 milliGRAM(s) IV Push every 6 hours PRN  promethazine 25 milliGRAM(s) Oral every 6 hours PRN  traMADol 50 milliGRAM(s) Oral every 6 hours PRN  traMADol 100 milliGRAM(s) Oral every 6 hours PRN    DVT prophylaxis:   apixaban 2.5 milliGRAM(s) Oral every 12 hours  aspirin enteric coated      aspirin enteric coated 81 milliGRAM(s) Oral daily      RADIOLOGY & ADDITIONAL STUDIES:    ASSESSMENT AND PLAN:     - Analgesic pain control  - DVT prophylaxis: ASA 81 mg daily       Eliquis2.5mg twice a day    SCDs       - Pain Management: Celebrex 200mg twice a day x 21 days   - PT/OT: Weight Bearing Status:  Weight bearing as tolerated, OOBTC           -  Incentive spirometry  - IVF  - Advance diet as tolerated  - Hospitalist is following  -  Follow up labs  -  Disposition:  Subacute Rehab

## 2019-05-01 NOTE — PROGRESS NOTE ADULT - MUSCULOSKELETAL
----- Message from Charlene Gabriel DO sent at 7/26/2018  2:47 PM CDT -----  Can notify Armando Archibald her GABRIEL came back Positive , but just barely and likely not enough to cause any issues, I think Dr. Susanne Cordova ordered these GI let her know  we forwarded  them t details… detailed exam decreased ROM/decreased ROM due to pain

## 2019-05-01 NOTE — PROGRESS NOTE ADULT - ATTENDING COMMENTS
Plan of care was discuss with patient, all questions were answered, seems understand and in agreement.

## 2019-05-01 NOTE — DISCHARGE NOTE NURSING/CASE MANAGEMENT/SOCIAL WORK - NSDCDPATPORTLINK_GEN_ALL_CORE
You can access the [facility] Portal, offered by [facility], by registering with the following website: [email or web address]

## 2019-05-01 NOTE — PHARMACOTHERAPY INTERVENTION NOTE - COMMENTS
Admission medication reconciliation POD1
Transition of Care education at bedside - medication calendar given to patient
Patient has a Caprini score of 15 after side 1 of staged TKA. Also takes ASA 81mg Qday chronically. Resume ASA EC 81mg Qday on POD1 after side 1. Dr. Alie APK. Dr. Stephon PAK (patient to get spinal anesthesia for side 2).

## 2019-05-16 ENCOUNTER — APPOINTMENT (OUTPATIENT)
Dept: ORTHOPEDIC SURGERY | Facility: CLINIC | Age: 69
End: 2019-05-16
Payer: MEDICARE

## 2019-05-16 VITALS
SYSTOLIC BLOOD PRESSURE: 120 MMHG | BODY MASS INDEX: 27.7 KG/M2 | WEIGHT: 209 LBS | DIASTOLIC BLOOD PRESSURE: 80 MMHG | HEIGHT: 73 IN | HEART RATE: 76 BPM | TEMPERATURE: 98.3 F

## 2019-05-16 PROCEDURE — 73562 X-RAY EXAM OF KNEE 3: CPT | Mod: 50

## 2019-05-16 PROCEDURE — 99024 POSTOP FOLLOW-UP VISIT: CPT

## 2019-05-21 NOTE — PHYSICAL THERAPY INITIAL EVALUATION ADULT - ADL SKILLS, REHAB EVAL
Called pt and left her a detailed vm with Behavioral Health information.     Pt can call me back if she needs additional info, I did state in the vm location and number to contact, and that we do not provide referrals for it.      independent

## 2019-06-25 ENCOUNTER — APPOINTMENT (OUTPATIENT)
Dept: ORTHOPEDIC SURGERY | Facility: CLINIC | Age: 69
End: 2019-06-25
Payer: MEDICARE

## 2019-06-25 ENCOUNTER — RX RENEWAL (OUTPATIENT)
Age: 69
End: 2019-06-25

## 2019-06-25 VITALS — DIASTOLIC BLOOD PRESSURE: 75 MMHG | HEART RATE: 65 BPM | SYSTOLIC BLOOD PRESSURE: 109 MMHG

## 2019-06-25 PROCEDURE — 73562 X-RAY EXAM OF KNEE 3: CPT | Mod: 50

## 2019-06-25 PROCEDURE — 99024 POSTOP FOLLOW-UP VISIT: CPT

## 2019-06-25 PROCEDURE — 73502 X-RAY EXAM HIP UNI 2-3 VIEWS: CPT | Mod: LT

## 2019-07-10 NOTE — PRE-OP CHECKLIST - RESPIRATORY RATE (BREATHS/MIN)
16
21
Island Pedicle Flap With Canthal Suspension Text: The defect edges were debeveled with a #15 scalpel blade.  Given the location of the defect, shape of the defect and the proximity to free margins an island pedicle advancement flap was deemed most appropriate.  Using a sterile surgical marker, an appropriate advancement flap was drawn incorporating the defect, outlining the appropriate donor tissue and placing the expected incisions within the relaxed skin tension lines where possible. The area thus outlined was incised deep to adipose tissue with a #15 scalpel blade.  The skin margins were undermined to an appropriate distance in all directions around the primary defect and laterally outward around the island pedicle utilizing iris scissors.  There was minimal undermining beneath the pedicle flap. A suspension suture was placed in the canthal tendon to prevent tension and prevent ectropion.

## 2019-07-15 NOTE — ASU PREOP CHECKLIST - INTERNAL PROSTHESES
Patient would like communication of their results via:      Cell Phone:   Telephone Information:   Mobile [phone]     Okay to leave a message containing results? Yes     Health Maintenance Due   Topic Date Due   • HIB Vaccine (4 of 4 - Standard series) 03/22/2019   • Hepatitis A Vaccine (1 of 2 - 2-dose series) 03/22/2019   • DTaP/Tdap/Td Vaccine (4 - DTaP) 06/22/2019                      yes(specify)/penile prosthesis

## 2019-08-13 ENCOUNTER — CHART COPY (OUTPATIENT)
Age: 69
End: 2019-08-13

## 2020-02-04 ENCOUNTER — APPOINTMENT (OUTPATIENT)
Dept: ORTHOPEDIC SURGERY | Facility: CLINIC | Age: 70
End: 2020-02-04
Payer: MEDICARE

## 2020-02-04 VITALS — SYSTOLIC BLOOD PRESSURE: 115 MMHG | HEART RATE: 65 BPM | DIASTOLIC BLOOD PRESSURE: 74 MMHG

## 2020-02-04 DIAGNOSIS — S76.912A STRAIN OF UNSPECIFIED MUSCLES, FASCIA AND TENDONS AT THIGH LEVEL, LEFT THIGH, INITIAL ENCOUNTER: ICD-10-CM

## 2020-02-04 PROCEDURE — 73502 X-RAY EXAM HIP UNI 2-3 VIEWS: CPT | Mod: RT

## 2020-02-04 PROCEDURE — 99213 OFFICE O/P EST LOW 20 MIN: CPT

## 2020-02-05 ENCOUNTER — RX RENEWAL (OUTPATIENT)
Age: 70
End: 2020-02-05

## 2020-03-10 ENCOUNTER — APPOINTMENT (OUTPATIENT)
Dept: ORTHOPEDIC SURGERY | Facility: CLINIC | Age: 70
End: 2020-03-10
Payer: MEDICARE

## 2020-03-10 VITALS — DIASTOLIC BLOOD PRESSURE: 79 MMHG | SYSTOLIC BLOOD PRESSURE: 119 MMHG | HEART RATE: 80 BPM

## 2020-03-10 DIAGNOSIS — M76.892 OTHER SPECIFIED ENTHESOPATHIES OF LEFT LOWER LIMB, EXCLUDING FOOT: ICD-10-CM

## 2020-03-10 DIAGNOSIS — Z96.652 PRESENCE OF LEFT ARTIFICIAL KNEE JOINT: ICD-10-CM

## 2020-03-10 PROCEDURE — 99213 OFFICE O/P EST LOW 20 MIN: CPT

## 2020-03-12 PROBLEM — Z96.652 STATUS POST TOTAL LEFT KNEE REPLACEMENT: Status: ACTIVE | Noted: 2019-05-16

## 2020-05-08 ENCOUNTER — APPOINTMENT (OUTPATIENT)
Dept: ULTRASOUND IMAGING | Facility: CLINIC | Age: 70
End: 2020-05-08
Payer: MEDICARE

## 2020-05-08 ENCOUNTER — RESULT REVIEW (OUTPATIENT)
Age: 70
End: 2020-05-08

## 2020-05-08 ENCOUNTER — OUTPATIENT (OUTPATIENT)
Dept: OUTPATIENT SERVICES | Facility: HOSPITAL | Age: 70
LOS: 1 days | End: 2020-05-08
Payer: MEDICARE

## 2020-05-08 DIAGNOSIS — M76.892 OTHER SPECIFIED ENTHESOPATHIES OF LEFT LOWER LIMB, EXCLUDING FOOT: ICD-10-CM

## 2020-05-08 DIAGNOSIS — Z98.890 OTHER SPECIFIED POSTPROCEDURAL STATES: Chronic | ICD-10-CM

## 2020-05-08 DIAGNOSIS — Z96.642 PRESENCE OF LEFT ARTIFICIAL HIP JOINT: ICD-10-CM

## 2020-05-08 DIAGNOSIS — C20 MALIGNANT NEOPLASM OF RECTUM: Chronic | ICD-10-CM

## 2020-05-08 DIAGNOSIS — Z96.642 PRESENCE OF LEFT ARTIFICIAL HIP JOINT: Chronic | ICD-10-CM

## 2020-05-08 DIAGNOSIS — Z90.79 ACQUIRED ABSENCE OF OTHER GENITAL ORGAN(S): Chronic | ICD-10-CM

## 2020-05-08 PROCEDURE — 20611 DRAIN/INJ JOINT/BURSA W/US: CPT

## 2020-05-08 PROCEDURE — 20611 DRAIN/INJ JOINT/BURSA W/US: CPT | Mod: LT

## 2020-09-16 ENCOUNTER — APPOINTMENT (OUTPATIENT)
Dept: ORTHOPEDIC SURGERY | Facility: CLINIC | Age: 70
End: 2020-09-16
Payer: MEDICARE

## 2020-09-16 VITALS
HEIGHT: 73 IN | TEMPERATURE: 97.9 F | BODY MASS INDEX: 27.17 KG/M2 | HEART RATE: 67 BPM | DIASTOLIC BLOOD PRESSURE: 85 MMHG | SYSTOLIC BLOOD PRESSURE: 128 MMHG | WEIGHT: 205 LBS

## 2020-09-16 DIAGNOSIS — Z78.9 OTHER SPECIFIED HEALTH STATUS: ICD-10-CM

## 2020-09-16 DIAGNOSIS — Z85.46 PERSONAL HISTORY OF MALIGNANT NEOPLASM OF PROSTATE: ICD-10-CM

## 2020-09-16 DIAGNOSIS — Z85.048 PERSONAL HISTORY OF OTHER MALIGNANT NEOPLASM OF RECTUM, RECTOSIGMOID JUNCTION, AND ANUS: ICD-10-CM

## 2020-09-16 DIAGNOSIS — M75.42 IMPINGEMENT SYNDROME OF LEFT SHOULDER: ICD-10-CM

## 2020-09-16 PROCEDURE — 99215 OFFICE O/P EST HI 40 MIN: CPT | Mod: 25

## 2020-09-16 PROCEDURE — 20610 DRAIN/INJ JOINT/BURSA W/O US: CPT | Mod: LT

## 2020-09-16 PROCEDURE — 73030 X-RAY EXAM OF SHOULDER: CPT | Mod: LT

## 2020-09-16 RX ORDER — FAMOTIDINE 10 MG/1
TABLET, FILM COATED ORAL
Refills: 0 | Status: ACTIVE | COMMUNITY

## 2020-09-16 RX ORDER — APIXABAN 5 MG/1
TABLET, FILM COATED ORAL
Refills: 0 | Status: DISCONTINUED | COMMUNITY
End: 2020-09-16

## 2020-09-16 RX ORDER — CHROMIUM 200 MCG
TABLET ORAL
Refills: 0 | Status: ACTIVE | COMMUNITY

## 2020-09-16 RX ORDER — CELECOXIB 200 MG/1
200 CAPSULE ORAL
Qty: 180 | Refills: 0 | Status: DISCONTINUED | COMMUNITY
Start: 2020-02-04 | End: 2020-09-16

## 2020-09-16 RX ORDER — ROSUVASTATIN CALCIUM 5 MG/1
TABLET, FILM COATED ORAL
Refills: 0 | Status: ACTIVE | COMMUNITY

## 2020-09-16 RX ORDER — MELOXICAM 15 MG/1
15 TABLET ORAL
Qty: 90 | Refills: 1 | Status: DISCONTINUED | COMMUNITY
Start: 2019-06-25 | End: 2020-09-16

## 2020-09-16 RX ORDER — AMOXICILLIN 875 MG/1
875 TABLET, FILM COATED ORAL
Qty: 20 | Refills: 0 | Status: DISCONTINUED | COMMUNITY
Start: 2018-02-16 | End: 2020-09-16

## 2020-09-16 RX ORDER — CELECOXIB 200 MG/1
200 CAPSULE ORAL
Refills: 0 | Status: DISCONTINUED | COMMUNITY
End: 2020-09-16

## 2020-09-16 RX ORDER — METOPROLOL TARTRATE 75 MG/1
TABLET, FILM COATED ORAL
Refills: 0 | Status: ACTIVE | COMMUNITY

## 2020-09-16 RX ORDER — ACETAMINOPHEN 500 MG/1
500 TABLET ORAL
Refills: 0 | Status: DISCONTINUED | COMMUNITY
End: 2020-09-16

## 2020-09-16 RX ORDER — ASPIRIN 81 MG/1
81 TABLET ORAL
Refills: 0 | Status: DISCONTINUED | COMMUNITY
End: 2020-09-16

## 2020-09-16 RX ORDER — ATORVASTATIN CALCIUM 20 MG/1
20 TABLET, FILM COATED ORAL
Qty: 90 | Refills: 0 | Status: DISCONTINUED | COMMUNITY
Start: 2018-01-10 | End: 2020-09-16

## 2020-09-16 RX ORDER — HYALURONATE SOD, CROSS-LINKED 30 MG/3 ML
30 SYRINGE (ML) INTRAARTICULAR
Qty: 2 | Refills: 0 | Status: DISCONTINUED | OUTPATIENT
Start: 2018-03-10 | End: 2020-09-16

## 2020-09-21 PROBLEM — M75.42 IMPINGEMENT SYNDROME OF LEFT SHOULDER: Status: ACTIVE | Noted: 2020-09-21

## 2020-09-21 NOTE — PROCEDURE
[de-identified] : Using sterile technique, 2cc of depomedrol (40mg/ml) and 3cc of 1% plain lidocaine was drawn up into a sterile 5cc syringe.  The posterior lateral corner of the left shoulder was then sterilely prepped with chlorhexidine and ethylene chloride spray was used as an anesthetic prior to injection.  The depomedrol/lidocaine mixture was injected into the subacromial space.  The patient tolerated the procedure well without difficulty.  The patient was given instructions on the use of ice and anti-inflammatories post injection site soreness.

## 2020-09-21 NOTE — HISTORY OF PRESENT ILLNESS
[de-identified] : This patient presents today with complaints of left shoulder pain going on for about 1 month.  Pain is been worsening recently.  Patient did push-ups about 2 weeks ago which started the pain.  He notes that the pain awakens him from sleep.  He has pain with overhead activities reaching pushing and pulling.  Pain level currently 5 out of 10.  Pain increases to 9 out of 10 when he lays down.  Pain is localized laterally.  It is dull and aching in nature.  He did have a similar episode about 3 years ago and was diagnosis of bursitis and got a cortisone shot which relieved his symptoms until yesterday.

## 2020-09-21 NOTE — DISCUSSION/SUMMARY
[de-identified] : The patient was seen today for left shoulder pain ongoing for about 1 month.  Physical exam and x-rays reveals evidence of impingement of the left shoulder.  Due to the patient's continued symptoms I recommended and performed a subacromial steroid injection.  If the patient symptoms do not improve the next week I would recommend an MRI for further evaluation.

## 2020-09-21 NOTE — PHYSICAL EXAM
[de-identified] : The patient appears well nourished  and in no apparent distress.  The patient is alert and oriented to person, place, and time.   Affect and mood appear normal.    The head is normocephalic and atraumatic.  The eyes reveal normal sclera and extra ocular muscles are intact.   The neck appears normal with no jugular venous distention or masses noted.   Skin shows normal turgor with no evidence of eczema or psoriasis.  No respiratory distress noted.  The patient ambulates with a normal gait.\par \par The left shoulder has full range of motion.  There is a positive impingement sign and a positive Bauman sign.  Rotator cuff strength is good but there is pain with supraspinatus testing.  There is no soft tissue swelling.  There is no tenderness to palpation. There is no eccyhmosis.  There is no erythema or warmth.     There is no instability.  No lymphadenopathy or edema is noted.  Pulses and capillary refill are normal.  Sensation is normal.     [de-identified] : AP, outlet,  and glenoid and axillary views of the left shoulder were obtained.  The glenohumeral and acromioclavicular joints are well maintained without evidence of degenerative arthritis.   There is no fracture, dislocation, or subluxation.

## 2020-11-09 ENCOUNTER — APPOINTMENT (OUTPATIENT)
Dept: ORTHOPEDIC SURGERY | Facility: CLINIC | Age: 70
End: 2020-11-09
Payer: MEDICARE

## 2020-11-09 VITALS
HEART RATE: 60 BPM | HEIGHT: 73 IN | SYSTOLIC BLOOD PRESSURE: 107 MMHG | BODY MASS INDEX: 27.17 KG/M2 | TEMPERATURE: 97.7 F | WEIGHT: 205 LBS | DIASTOLIC BLOOD PRESSURE: 69 MMHG

## 2020-11-09 DIAGNOSIS — M75.122 COMPLETE ROTATOR CUFF TEAR OR RUPTURE OF LEFT SHOULDER, NOT SPECIFIED AS TRAUMATIC: ICD-10-CM

## 2020-11-09 PROCEDURE — 99214 OFFICE O/P EST MOD 30 MIN: CPT

## 2020-11-09 RX ORDER — METOPROLOL SUCCINATE 25 MG/1
25 TABLET, EXTENDED RELEASE ORAL
Qty: 90 | Refills: 0 | Status: ACTIVE | COMMUNITY
Start: 2020-06-02

## 2020-12-04 ENCOUNTER — OUTPATIENT (OUTPATIENT)
Dept: OUTPATIENT SERVICES | Facility: HOSPITAL | Age: 70
LOS: 1 days | End: 2020-12-04
Payer: MEDICARE

## 2020-12-04 VITALS
DIASTOLIC BLOOD PRESSURE: 75 MMHG | HEART RATE: 60 BPM | TEMPERATURE: 98 F | OXYGEN SATURATION: 97 % | RESPIRATION RATE: 14 BRPM | SYSTOLIC BLOOD PRESSURE: 115 MMHG | WEIGHT: 203.05 LBS | HEIGHT: 73 IN

## 2020-12-04 DIAGNOSIS — C20 MALIGNANT NEOPLASM OF RECTUM: Chronic | ICD-10-CM

## 2020-12-04 DIAGNOSIS — Z96.642 PRESENCE OF LEFT ARTIFICIAL HIP JOINT: Chronic | ICD-10-CM

## 2020-12-04 DIAGNOSIS — Z96.652 PRESENCE OF LEFT ARTIFICIAL KNEE JOINT: Chronic | ICD-10-CM

## 2020-12-04 DIAGNOSIS — Z96.651 PRESENCE OF RIGHT ARTIFICIAL KNEE JOINT: Chronic | ICD-10-CM

## 2020-12-04 DIAGNOSIS — Z98.890 OTHER SPECIFIED POSTPROCEDURAL STATES: Chronic | ICD-10-CM

## 2020-12-04 DIAGNOSIS — M75.122 COMPLETE ROTATOR CUFF TEAR OR RUPTURE OF LEFT SHOULDER, NOT SPECIFIED AS TRAUMATIC: ICD-10-CM

## 2020-12-04 DIAGNOSIS — Z90.79 ACQUIRED ABSENCE OF OTHER GENITAL ORGAN(S): Chronic | ICD-10-CM

## 2020-12-04 RX ORDER — ASPIRIN/CALCIUM CARB/MAGNESIUM 324 MG
1 TABLET ORAL
Qty: 0 | Refills: 0 | DISCHARGE

## 2020-12-04 NOTE — H&P PST ADULT - NSICDXPASTSURGICALHX_GEN_ALL_CORE_FT
PAST SURGICAL HISTORY:  History of left knee replacement 2019    Rectal cancer 2004 tumor removed    S/P hip replacement, left 11/2018    S/P insertion of penile implant 2013    S/P knee surgery bilateral knees arthroscopy    S/P prostatectomy 2008    Status post right knee replacement 2019

## 2020-12-04 NOTE — H&P PST ADULT - ASSESSMENT
67 y/o male with left shoulder pain       Plan; left shoulder arthroscopy   Medical clearance   H/o abdominal aneurysm will obtain abdominal sonogram results from  Dr siu vascular   Pre op instructions  COVID testing on 12/9/20 at 1 pm

## 2020-12-04 NOTE — H&P PST ADULT - HISTORY OF PRESENT ILLNESS
69 y/o male with complaint of left shoulder pain . Reports intermittent pain and worse pain at night laying position MRI showed rotator cuff tear. scheduled for left shoulder arthroscopy on 12/11/20

## 2020-12-06 DIAGNOSIS — Z11.59 ENCOUNTER FOR SCREENING FOR OTHER VIRAL DISEASES: ICD-10-CM

## 2020-12-06 DIAGNOSIS — Z01.818 ENCOUNTER FOR OTHER PREPROCEDURAL EXAMINATION: ICD-10-CM

## 2020-12-06 PROCEDURE — G0463: CPT

## 2020-12-09 ENCOUNTER — OUTPATIENT (OUTPATIENT)
Dept: OUTPATIENT SERVICES | Facility: HOSPITAL | Age: 70
LOS: 1 days | End: 2020-12-09
Payer: MEDICARE

## 2020-12-09 DIAGNOSIS — Z90.79 ACQUIRED ABSENCE OF OTHER GENITAL ORGAN(S): Chronic | ICD-10-CM

## 2020-12-09 DIAGNOSIS — Z96.652 PRESENCE OF LEFT ARTIFICIAL KNEE JOINT: Chronic | ICD-10-CM

## 2020-12-09 DIAGNOSIS — Z11.59 ENCOUNTER FOR SCREENING FOR OTHER VIRAL DISEASES: ICD-10-CM

## 2020-12-09 DIAGNOSIS — Z96.642 PRESENCE OF LEFT ARTIFICIAL HIP JOINT: Chronic | ICD-10-CM

## 2020-12-09 DIAGNOSIS — Z98.890 OTHER SPECIFIED POSTPROCEDURAL STATES: Chronic | ICD-10-CM

## 2020-12-09 DIAGNOSIS — Z96.651 PRESENCE OF RIGHT ARTIFICIAL KNEE JOINT: Chronic | ICD-10-CM

## 2020-12-09 DIAGNOSIS — C20 MALIGNANT NEOPLASM OF RECTUM: Chronic | ICD-10-CM

## 2020-12-09 LAB — SARS-COV-2 RNA SPEC QL NAA+PROBE: SIGNIFICANT CHANGE UP

## 2020-12-09 PROCEDURE — U0003: CPT

## 2020-12-10 ENCOUNTER — TRANSCRIPTION ENCOUNTER (OUTPATIENT)
Age: 70
End: 2020-12-10

## 2020-12-10 VITALS
OXYGEN SATURATION: 97 % | HEIGHT: 73 IN | DIASTOLIC BLOOD PRESSURE: 75 MMHG | WEIGHT: 201.5 LBS | RESPIRATION RATE: 14 BRPM | TEMPERATURE: 98 F | SYSTOLIC BLOOD PRESSURE: 115 MMHG | HEART RATE: 60 BPM

## 2020-12-10 NOTE — ASU PATIENT PROFILE, ADULT - PMH
Aortic aneurysm  4.4 cm-being observed, no changes  GERD (gastroesophageal reflux disease)    Osteoarthritis    Prostate cancer  2008,  Rectal cancer  2004

## 2020-12-10 NOTE — ASU PATIENT PROFILE, ADULT - BLOOD AVOIDANCE/RESTRICTIONS, PROFILE
You have chosen to receive care through a telephone visit. Do you consent to use a telephone visit for your medical care today? Yes     none

## 2020-12-10 NOTE — ASU PREOP CHECKLIST - TO WHOM
If you are a smoker, it is important for your health to stop smoking. Please be aware that second hand smoke is also harmful.
Zbigniew LUGO

## 2020-12-11 ENCOUNTER — APPOINTMENT (OUTPATIENT)
Dept: ORTHOPEDIC SURGERY | Facility: HOSPITAL | Age: 70
End: 2020-12-11

## 2020-12-11 ENCOUNTER — OUTPATIENT (OUTPATIENT)
Dept: OUTPATIENT SERVICES | Facility: HOSPITAL | Age: 70
LOS: 1 days | End: 2020-12-11
Payer: MEDICARE

## 2020-12-11 ENCOUNTER — RESULT REVIEW (OUTPATIENT)
Age: 70
End: 2020-12-11

## 2020-12-11 VITALS
OXYGEN SATURATION: 99 % | SYSTOLIC BLOOD PRESSURE: 108 MMHG | DIASTOLIC BLOOD PRESSURE: 60 MMHG | HEART RATE: 67 BPM | RESPIRATION RATE: 20 BRPM

## 2020-12-11 DIAGNOSIS — Z96.651 PRESENCE OF RIGHT ARTIFICIAL KNEE JOINT: Chronic | ICD-10-CM

## 2020-12-11 DIAGNOSIS — Z96.642 PRESENCE OF LEFT ARTIFICIAL HIP JOINT: Chronic | ICD-10-CM

## 2020-12-11 DIAGNOSIS — M75.122 COMPLETE ROTATOR CUFF TEAR OR RUPTURE OF LEFT SHOULDER, NOT SPECIFIED AS TRAUMATIC: ICD-10-CM

## 2020-12-11 DIAGNOSIS — Z98.890 OTHER SPECIFIED POSTPROCEDURAL STATES: Chronic | ICD-10-CM

## 2020-12-11 DIAGNOSIS — Z90.79 ACQUIRED ABSENCE OF OTHER GENITAL ORGAN(S): Chronic | ICD-10-CM

## 2020-12-11 DIAGNOSIS — C20 MALIGNANT NEOPLASM OF RECTUM: Chronic | ICD-10-CM

## 2020-12-11 DIAGNOSIS — Z96.652 PRESENCE OF LEFT ARTIFICIAL KNEE JOINT: Chronic | ICD-10-CM

## 2020-12-11 PROCEDURE — C1713: CPT

## 2020-12-11 PROCEDURE — 88304 TISSUE EXAM BY PATHOLOGIST: CPT | Mod: 26

## 2020-12-11 PROCEDURE — 29827 SHO ARTHRS SRG RT8TR CUF RPR: CPT | Mod: LT

## 2020-12-11 PROCEDURE — 29826 SHO ARTHRS SRG DECOMPRESSION: CPT | Mod: LT

## 2020-12-11 PROCEDURE — 88304 TISSUE EXAM BY PATHOLOGIST: CPT

## 2020-12-11 RX ORDER — ASPIRIN/CALCIUM CARB/MAGNESIUM 324 MG
1 TABLET ORAL
Qty: 0 | Refills: 0 | DISCHARGE

## 2020-12-11 RX ORDER — METOPROLOL TARTRATE 50 MG
1 TABLET ORAL
Qty: 0 | Refills: 0 | DISCHARGE

## 2020-12-11 RX ORDER — CEFAZOLIN SODIUM 1 G
2000 VIAL (EA) INJECTION ONCE
Refills: 0 | Status: COMPLETED | OUTPATIENT
Start: 2020-12-11 | End: 2020-12-11

## 2020-12-11 RX ORDER — HYDROMORPHONE HYDROCHLORIDE 2 MG/ML
0.5 INJECTION INTRAMUSCULAR; INTRAVENOUS; SUBCUTANEOUS
Refills: 0 | Status: DISCONTINUED | OUTPATIENT
Start: 2020-12-11 | End: 2020-12-11

## 2020-12-11 RX ORDER — FAMOTIDINE 10 MG/ML
1 INJECTION INTRAVENOUS
Qty: 0 | Refills: 0 | DISCHARGE

## 2020-12-11 RX ORDER — SODIUM CHLORIDE 9 MG/ML
1000 INJECTION, SOLUTION INTRAVENOUS
Refills: 0 | Status: DISCONTINUED | OUTPATIENT
Start: 2020-12-11 | End: 2020-12-11

## 2020-12-11 RX ORDER — OXYCODONE HYDROCHLORIDE 5 MG/1
5 TABLET ORAL ONCE
Refills: 0 | Status: DISCONTINUED | OUTPATIENT
Start: 2020-12-11 | End: 2020-12-11

## 2020-12-11 RX ORDER — APREPITANT 80 MG/1
40 CAPSULE ORAL ONCE
Refills: 0 | Status: COMPLETED | OUTPATIENT
Start: 2020-12-11 | End: 2020-12-11

## 2020-12-11 RX ORDER — ROSUVASTATIN CALCIUM 5 MG/1
1 TABLET ORAL
Qty: 0 | Refills: 0 | DISCHARGE

## 2020-12-11 RX ORDER — CHLORHEXIDINE GLUCONATE 213 G/1000ML
1 SOLUTION TOPICAL ONCE
Refills: 0 | Status: COMPLETED | OUTPATIENT
Start: 2020-12-11 | End: 2020-12-11

## 2020-12-11 RX ORDER — ONDANSETRON 8 MG/1
4 TABLET, FILM COATED ORAL ONCE
Refills: 0 | Status: COMPLETED | OUTPATIENT
Start: 2020-12-11 | End: 2020-12-11

## 2020-12-11 RX ADMIN — SODIUM CHLORIDE 75 MILLILITER(S): 9 INJECTION, SOLUTION INTRAVENOUS at 15:02

## 2020-12-11 RX ADMIN — APREPITANT 40 MILLIGRAM(S): 80 CAPSULE ORAL at 08:28

## 2020-12-11 RX ADMIN — CHLORHEXIDINE GLUCONATE 1 APPLICATION(S): 213 SOLUTION TOPICAL at 08:28

## 2020-12-11 RX ADMIN — ONDANSETRON 4 MILLIGRAM(S): 8 TABLET, FILM COATED ORAL at 15:02

## 2020-12-11 NOTE — ASU DISCHARGE PLAN (ADULT/PEDIATRIC) - CALL YOUR DOCTOR IF YOU HAVE ANY OF THE FOLLOWING:
Wound/Surgical Site with redness, or foul smelling discharge or pus/Numbness, tingling, color or temperature change to extremity/Nausea and vomiting that does not stop/Fever greater than (need to indicate Fahrenheit or Celsius)/Bleeding that does not stop/Pain not relieved by Medications

## 2020-12-11 NOTE — BRIEF OPERATIVE NOTE - NSICDXBRIEFPREOP_GEN_ALL_CORE_FT
PRE-OP DIAGNOSIS:  Tear of rotator cuff 11-Dec-2020 13:07:13 Left Jose Alfredo Patton  Impingement syndrome of shoulder 11-Dec-2020 13:07:10 Left Jose Alfredo Patton

## 2020-12-11 NOTE — ASU DISCHARGE PLAN (ADULT/PEDIATRIC) - CARE PROVIDER_API CALL
Justino Brown  ORTHOPAEDIC SURGERY  [address], [address]  [place] [address]  Phone: [phone]  Fax: [phone]  Follow Up Time:

## 2020-12-11 NOTE — ASU DISCHARGE PLAN (ADULT/PEDIATRIC) - ASU DC SPECIAL INSTRUCTIONSFT
Refer to pamphlet for post-op instructions.     Follow all verbal and written instructions. Take medications as prescribed. DO NOT drive, operate machinery, and/or make important decisions while on prescription pain medication. DO NOT hesitate to call Doctor's office with questions or concerns.    - Call your doctor if you experience:  • An increase in pain not controlled by pain medication or change in activity or  position.  • Temperature greater than 101° F.  • Redness, increased swelling or foul smelling drainage from or around the  incision.  • Numbness, tingling or a change in color or temperature of the operative extremity.  • Call your doctor immediately if you experience chest pain, shortness of breath or calf pain.

## 2020-12-11 NOTE — BRIEF OPERATIVE NOTE - NSICDXBRIEFPOSTOP_GEN_ALL_CORE_FT
POST-OP DIAGNOSIS:  Impingement syndrome of shoulder 11-Dec-2020 13:07:17 Left Jose Alfredo Patton  Tear of rotator cuff 11-Dec-2020 13:07:14 Left Jose Alfredo Patton

## 2020-12-15 LAB — SURGICAL PATHOLOGY STUDY: SIGNIFICANT CHANGE UP

## 2020-12-21 ENCOUNTER — APPOINTMENT (OUTPATIENT)
Dept: ORTHOPEDIC SURGERY | Facility: CLINIC | Age: 70
End: 2020-12-21
Payer: MEDICARE

## 2020-12-21 VITALS — DIASTOLIC BLOOD PRESSURE: 71 MMHG | SYSTOLIC BLOOD PRESSURE: 113 MMHG | HEART RATE: 69 BPM | TEMPERATURE: 97 F

## 2020-12-21 PROCEDURE — 99024 POSTOP FOLLOW-UP VISIT: CPT

## 2020-12-21 PROCEDURE — 73030 X-RAY EXAM OF SHOULDER: CPT | Mod: LT

## 2021-01-25 ENCOUNTER — APPOINTMENT (OUTPATIENT)
Dept: ORTHOPEDIC SURGERY | Facility: CLINIC | Age: 71
End: 2021-01-25
Payer: MEDICARE

## 2021-01-25 VITALS
DIASTOLIC BLOOD PRESSURE: 68 MMHG | TEMPERATURE: 97.3 F | HEART RATE: 64 BPM | HEIGHT: 73 IN | SYSTOLIC BLOOD PRESSURE: 101 MMHG | WEIGHT: 200 LBS | BODY MASS INDEX: 26.51 KG/M2

## 2021-01-25 PROCEDURE — 99024 POSTOP FOLLOW-UP VISIT: CPT

## 2021-03-08 ENCOUNTER — APPOINTMENT (OUTPATIENT)
Dept: ORTHOPEDIC SURGERY | Facility: CLINIC | Age: 71
End: 2021-03-08
Payer: MEDICARE

## 2021-03-08 VITALS — SYSTOLIC BLOOD PRESSURE: 116 MMHG | DIASTOLIC BLOOD PRESSURE: 76 MMHG | TEMPERATURE: 98 F | HEART RATE: 67 BPM

## 2021-03-08 DIAGNOSIS — Z98.890 OTHER SPECIFIED POSTPROCEDURAL STATES: ICD-10-CM

## 2021-03-08 PROCEDURE — 99024 POSTOP FOLLOW-UP VISIT: CPT

## 2021-07-06 ENCOUNTER — APPOINTMENT (OUTPATIENT)
Dept: ORTHOPEDIC SURGERY | Facility: CLINIC | Age: 71
End: 2021-07-06
Payer: MEDICARE

## 2021-07-06 VITALS
DIASTOLIC BLOOD PRESSURE: 78 MMHG | HEIGHT: 73 IN | BODY MASS INDEX: 26.77 KG/M2 | SYSTOLIC BLOOD PRESSURE: 119 MMHG | HEART RATE: 59 BPM | WEIGHT: 202 LBS

## 2021-07-06 DIAGNOSIS — Z96.642 PRESENCE OF LEFT ARTIFICIAL HIP JOINT: ICD-10-CM

## 2021-07-06 PROCEDURE — 73502 X-RAY EXAM HIP UNI 2-3 VIEWS: CPT | Mod: LT

## 2021-07-06 PROCEDURE — 99213 OFFICE O/P EST LOW 20 MIN: CPT

## 2021-07-16 NOTE — OCCUPATIONAL THERAPY INITIAL EVALUATION ADULT - BED MOBILITY LIMITATIONS, REHAB EVAL
Verbal - The patient responds to verbal stimuli by opening their eyes when someone speaks to them. The patient is not fully oriented to time, place, or person.
decreased ability to use legs for bridging/pushing
decreased ability to use legs for bridging/pushing

## 2021-08-09 NOTE — H&P PST ADULT - NS NEC GEN PE MLT EXAM PC
Patient comes to clinic for follow up anticoagulation visit.  Last INR on 7/21/21 was 2.2.  Dose maintained.   Today's INR is 2.3 and is within goal range.    Current warfarin total weekly dose of 13.75 mg verified.  Informed the INR result is within therapeutic range and instructed to maintain current dose per protocol. Discussed dose and return date of 9/8/21 for next INR. See Anticoagulation flowsheet.    INR today is 2.3, remains in range and down from previous INR 2.1 in two weeks, while on continued dose of 13.75mg/wk. DCCV complete on 7/26 (unsuccessful). Continue same dosing and recheck INR in 4 weeks.   Dr. Davidson is in the office today supervising the treatment.    Instructed to contact the clinic with any unusual bleeding or bruising, any changes in medications, diet, health status, lifestyle, or any other changes, questions or concerns. Verbalized understanding of all discussed.     
No bruits; no thyromegaly or nodules

## 2022-06-10 ENCOUNTER — NON-APPOINTMENT (OUTPATIENT)
Age: 72
End: 2022-06-10

## 2023-05-02 ENCOUNTER — APPOINTMENT (OUTPATIENT)
Dept: ORTHOPEDIC SURGERY | Facility: CLINIC | Age: 73
End: 2023-05-02
Payer: MEDICARE

## 2023-05-02 ENCOUNTER — NON-APPOINTMENT (OUTPATIENT)
Age: 73
End: 2023-05-02

## 2023-05-02 VITALS
HEIGHT: 73 IN | SYSTOLIC BLOOD PRESSURE: 105 MMHG | HEART RATE: 55 BPM | WEIGHT: 204 LBS | DIASTOLIC BLOOD PRESSURE: 67 MMHG | BODY MASS INDEX: 27.04 KG/M2

## 2023-05-02 DIAGNOSIS — Z96.651 PRESENCE OF RIGHT ARTIFICIAL KNEE JOINT: ICD-10-CM

## 2023-05-02 DIAGNOSIS — Z96.659 OTHER SPECIFIED COMPLICATION OF INTERNAL ORTHOPEDIC PROSTHETIC DEVICES, IMPLANTS AND GRAFTS, SUBSEQUENT ENCOUNTER: ICD-10-CM

## 2023-05-02 DIAGNOSIS — M25.669 OTHER SPECIFIED COMPLICATION OF INTERNAL ORTHOPEDIC PROSTHETIC DEVICES, IMPLANTS AND GRAFTS, SUBSEQUENT ENCOUNTER: ICD-10-CM

## 2023-05-02 DIAGNOSIS — T84.89XD OTHER SPECIFIED COMPLICATION OF INTERNAL ORTHOPEDIC PROSTHETIC DEVICES, IMPLANTS AND GRAFTS, SUBSEQUENT ENCOUNTER: ICD-10-CM

## 2023-05-02 PROCEDURE — 73562 X-RAY EXAM OF KNEE 3: CPT | Mod: RT

## 2023-05-02 PROCEDURE — 99213 OFFICE O/P EST LOW 20 MIN: CPT

## 2023-09-27 NOTE — ASU PATIENT PROFILE, ADULT - NSALCOHOLAMT_GEN_A_CORE_SD
Opioid Pregnancy And Lactation Text: These medications can lead to premature delivery and should be avoided during pregnancy. These medications are also present in breast milk in small amounts. 1-2 drinks

## 2024-01-12 NOTE — PATIENT PROFILE ADULT - CAREGIVER ADDRESS
----- Message from Laura Ledbetter sent at 1/12/2024 10:56 AM EST -----  Subject: Referral Request    Reason for referral request? Patient would like to have a bone density   test. States her bones and joints have been hurting. Please call and   advise  Provider patient wants to be referred to(if known):     Provider Phone Number(if known):    Additional Information for Provider?   ---------------------------------------------------------------------------  --------------  CALL BACK INFO    [phone]; OK to leave message on voicemail  ---------------------------------------------------------------------------  --------------  
A bone density test has nothing to offer regarding her joint complaints. They are orthopedic in nature and on going. This is not an urgent issue and can wait for an appointment    In addition she is not due for one until Nov, 2024.  
Spoke with pt in regards to bone and joint pain. Two patient identifier's verified.  Pt advise her last Dexa scan was 2022 and will notify the PCP of her concerns. Pt request an appointment for today. Pt is advised of the PCP's new hours in clinic. Pt states she will continue to see the PCP until she can secure a new PCP closer in her area. Pt voices concerns about her pain. Pt states this is not a new pain but has worsen. Pt encouraged to go to the Urgent care or ED for further evaluation. Pt voices concerns about go to the Urgent Care. Pt educated about [facility] and states she is agreeable to going to be seen. Pt provided the contact information for [facility].  PCP notified.     
[address] [facility], [place]

## 2024-01-12 NOTE — H&P PST ADULT - HEIGHT IN CM
Shift assessment complete. VSS. Pt A/OX4, very agitated, speaking to writer via , requesting RN to \"knock him out\" prior to going downstairs to OR. Educated patient on normal policies and pain medication regimen. Was instructed by AVINASH Castañeda in pre op, NOT to give PRN Dilaudid prior to transport downstairs, so that patient could sign surgical consent. Pt appears angry about this, but finally says \"fine, just take me downstairs\" Transport here to take patient in bed to same day surgery.    182.88

## 2024-05-05 NOTE — H&P PST ADULT - FUNCTIONAL LEVEL PRIOR: AMBULATION
Cast unwrapped - provider aware and at bedside    Rewrapped wrist w/ cast as provider wanted  No pain, numbness, tingling in extremities  No further needs per pt   0 = independent

## 2024-05-24 NOTE — H&P PST ADULT - NSANTHOBSERVEDRD_ENT_A_CORE
Continued Stay SW/CM Assessment/Plan of Care Note       Active Substitute Decision Maker (SDM)       Viviana Talley [facility] Care Agent 1 - Spouse   Primary Phone: [phone] (Mobile)                     Progress note:  AALIYAH met pt & SDM in room today.  SDM is agreeable for pt to DC to [facility] when auth received.  Per Yifan Mcdonnell rep, awaiting for auth request from facility.  Sathya reported reviewing w/ Jose, [facility] admissions, on how to request auth.  AALIYAH l/m for Jose, requesting for Jose to request for an auth.  SDM & sister, Mayra, were both updated.  Sathya will provide auth when [facility] is obtained [facility].  Updates sent [facility] Shrestha[facility]iller.  AALIYAH/CM to f/u.  RK     See SW/CM flowsheets for other objective data.    Disposition Recommendations:  Preliminary discharge destination: Planned Discharge Destination: Home with services/support  SW/CM recommendation for discharge: SNF    Destination Pharmacy: [facility] - [place], [place] - [address] Pharmacy confirmed with facility, Preferred pharmacy updated    Discharge Plan/Needs:     Continued Care and Services - Admitted Since 3/20/2024       Home Medical Care Coordination complete.      Service Provider Request Status Selected Services Address Phone Fax    [facility] -   Selected Home Health Services [address], [facility] [address] [address] -- --              Destination        Service Provider Request Status Selected Services Address Phone Fax    [facility] - SNF Declined  Not eligible N/A [address], [facility] [address] -- --                      Devices/ Equipment that need to be arranged for discharge: None at this time       Prior To Hospitalization:    Living Situation: Spouse and residing at House    .  Support Systems: Significant other   Home Devices/Equipment: Blood glucose monitor, Mobility assist device            Mobility  Assist Devices: Wheelchair, Standard walker   Type of Service Prior to Hospitalization: None               Patient/Family discharge goal (s):  SNF     Resources provided:           Prior Function        Ambulation in the Home: Total Assist (Total) (assist of 2 at [facility], unclear DME or distance) (05/22/24 0922 : Moritz, Ellyn M, PT)       Current Function  Last Filed Values         Value Time User    Current Function  significantly below baseline level of function 5/22/2024 12:43 PM Moritz, Ellyn M, PT    Therapy Impairments  cognition; strength; balance 5/22/2024 12:43 PM Moritz, Ellyn M, PT    ADLs Requiring Support  bed mobility; transfers; ambulation; stairs 5/10/2024  1:39 PM Viviana Clark, PT            Therapy Recommendations for Discharge:   PT:      Last Filed Values         Value Time User    PT Discharge Needs  does not require ongoing therapy  24 hr non-skilled assistance 5/22/2024 12:43 PM Moritz, Ellyn M, PT          OT:       Last Filed Values         Value Time User    OT Discharge Needs  does not require ongoing therapy  REQUIRES 24/7 assistance d/t pt is not following commands, unarousable, and will not be able appropraitely participate in skilled therapy services 5/22/2024  1:25 PM Berhane Grier OT          SLP:    Last Filed Values       None            Mobility Equipment Recommended for Discharge: hospital bed if home      Barriers to Discharge  Identified Barriers to Discharge/Transition Planning: Payor auth               No

## 2024-05-29 RX ORDER — AMOXICILLIN 500 MG/1
500 TABLET, FILM COATED ORAL
Qty: 20 | Refills: 2 | Status: ACTIVE | COMMUNITY
Start: 2018-11-21 | End: 1900-01-01

## 2024-06-02 NOTE — PHYSICAL THERAPY INITIAL EVALUATION ADULT - LEVEL OF INDEPENDENCE: SUPINE/SIT, REHAB EVAL
Pharmacy Intern Admission Medication History    Admission medication history is complete. The information provided in this note is only as accurate as the sources available at the time of the update.    Information Source(s): Patient and CareEverywhere/SureScripts via in-person    Pertinent Information: Amoxicillin: takes amoxicillin 1-2 tablets by mouth daily as needed for flares of folliculitis; He has been using for a couple of weeks recently. Last dose was yesterday morning    Changes made to PTA medication list:  Added: None  Deleted: None  Changed: added prn on amoxicillin    Allergies reviewed with patient and updates made in EHR: yes    Medication History Completed By: Clarisse Bragg 6/2/2024 10:55 AM    PTA Med List   Medication Sig Last Dose    amoxicillin (AMOXIL) 500 MG tablet Take 500 mg by mouth 2 times daily as needed (flares (foliculitis)) 6/1/2024 at AM    cholecalciferol (VITAMIN D3) 25 mcg (1000 units) capsule Take 1 capsule by mouth daily 6/1/2024 at AM    MULTIVITAMIN ORAL [MULTIVITAMIN ORAL] Take by mouth daily. 6/1/2024 at AM     
minimum assist (75% patients effort)
minimum assist (75% patients effort)

## 2024-06-20 ENCOUNTER — APPOINTMENT (OUTPATIENT)
Dept: CT IMAGING | Facility: CLINIC | Age: 74
End: 2024-06-20
Payer: MEDICARE

## 2024-06-20 ENCOUNTER — OUTPATIENT (OUTPATIENT)
Dept: OUTPATIENT SERVICES | Facility: HOSPITAL | Age: 74
LOS: 1 days | End: 2024-06-20
Payer: MEDICARE

## 2024-06-20 DIAGNOSIS — Z00.00 ENCOUNTER FOR GENERAL ADULT MEDICAL EXAMINATION WITHOUT ABNORMAL FINDINGS: ICD-10-CM

## 2024-06-20 DIAGNOSIS — Z98.890 OTHER SPECIFIED POSTPROCEDURAL STATES: Chronic | ICD-10-CM

## 2024-06-20 DIAGNOSIS — Z96.651 PRESENCE OF RIGHT ARTIFICIAL KNEE JOINT: Chronic | ICD-10-CM

## 2024-06-20 DIAGNOSIS — C20 MALIGNANT NEOPLASM OF RECTUM: Chronic | ICD-10-CM

## 2024-06-20 DIAGNOSIS — Z96.642 PRESENCE OF LEFT ARTIFICIAL HIP JOINT: Chronic | ICD-10-CM

## 2024-06-20 DIAGNOSIS — Z90.79 ACQUIRED ABSENCE OF OTHER GENITAL ORGAN(S): Chronic | ICD-10-CM

## 2024-06-20 DIAGNOSIS — Z96.652 PRESENCE OF LEFT ARTIFICIAL KNEE JOINT: Chronic | ICD-10-CM

## 2024-06-20 PROCEDURE — 74177 CT ABD & PELVIS W/CONTRAST: CPT | Mod: MH

## 2024-06-20 PROCEDURE — 74177 CT ABD & PELVIS W/CONTRAST: CPT | Mod: 26,MH

## 2025-04-23 NOTE — PATIENT PROFILE ADULT - CAREGIVER NAME
Rectus abdominus is the condition in the abdomen and is not something that needs further treatment/surgery.     Kev Jordan

## 2025-08-29 ENCOUNTER — NON-APPOINTMENT (OUTPATIENT)
Age: 75
End: 2025-08-29